# Patient Record
Sex: FEMALE | Race: WHITE | Employment: OTHER | URBAN - METROPOLITAN AREA
[De-identification: names, ages, dates, MRNs, and addresses within clinical notes are randomized per-mention and may not be internally consistent; named-entity substitution may affect disease eponyms.]

---

## 2021-04-09 ENCOUNTER — ANESTHESIA (OUTPATIENT)
Dept: EMERGENCY DEPT | Facility: HOSPITAL | Age: 86
DRG: 481 | End: 2021-04-09
Payer: MEDICARE

## 2021-04-09 ENCOUNTER — APPOINTMENT (OUTPATIENT)
Dept: GENERAL RADIOLOGY | Facility: HOSPITAL | Age: 86
DRG: 481 | End: 2021-04-09
Attending: EMERGENCY MEDICINE
Payer: MEDICARE

## 2021-04-09 ENCOUNTER — HOSPITAL ENCOUNTER (INPATIENT)
Facility: HOSPITAL | Age: 86
LOS: 4 days | Discharge: NURSING, REHAB OR LONG-TERM CARE | DRG: 481 | End: 2021-04-13
Attending: EMERGENCY MEDICINE | Admitting: HOSPITALIST
Payer: MEDICARE

## 2021-04-09 ENCOUNTER — ANESTHESIA EVENT (OUTPATIENT)
Dept: EMERGENCY DEPT | Facility: HOSPITAL | Age: 86
DRG: 481 | End: 2021-04-09
Payer: MEDICARE

## 2021-04-09 DIAGNOSIS — R73.9 HYPERGLYCEMIA: ICD-10-CM

## 2021-04-09 DIAGNOSIS — S72.342A CLOSED DISPLACED SPIRAL FRACTURE OF SHAFT OF LEFT FEMUR, INITIAL ENCOUNTER (HCC): Primary | ICD-10-CM

## 2021-04-09 DIAGNOSIS — F03.91 DEMENTIA WITH BEHAVIORAL DISTURBANCE, UNSPECIFIED DEMENTIA TYPE (HCC): ICD-10-CM

## 2021-04-09 PROCEDURE — 71045 X-RAY EXAM CHEST 1 VIEW: CPT | Performed by: EMERGENCY MEDICINE

## 2021-04-09 PROCEDURE — 99223 1ST HOSP IP/OBS HIGH 75: CPT | Performed by: HOSPITALIST

## 2021-04-09 PROCEDURE — 3E0T3BZ INTRODUCTION OF ANESTHETIC AGENT INTO PERIPHERAL NERVES AND PLEXI, PERCUTANEOUS APPROACH: ICD-10-PCS | Performed by: ANESTHESIOLOGY

## 2021-04-09 PROCEDURE — 73502 X-RAY EXAM HIP UNI 2-3 VIEWS: CPT | Performed by: EMERGENCY MEDICINE

## 2021-04-09 PROCEDURE — 73552 X-RAY EXAM OF FEMUR 2/>: CPT | Performed by: EMERGENCY MEDICINE

## 2021-04-09 RX ORDER — MORPHINE SULFATE 2 MG/ML
2 INJECTION, SOLUTION INTRAMUSCULAR; INTRAVENOUS EVERY 2 HOUR PRN
Status: DISCONTINUED | OUTPATIENT
Start: 2021-04-09 | End: 2021-04-13

## 2021-04-09 RX ORDER — METOCLOPRAMIDE HYDROCHLORIDE 5 MG/ML
5 INJECTION INTRAMUSCULAR; INTRAVENOUS EVERY 8 HOURS PRN
Status: DISCONTINUED | OUTPATIENT
Start: 2021-04-09 | End: 2021-04-13

## 2021-04-09 RX ORDER — ACETAMINOPHEN/DIPHENHYDRAMINE 500MG-25MG
1 TABLET ORAL DAILY PRN
Status: ON HOLD | COMMUNITY
End: 2021-04-13

## 2021-04-09 RX ORDER — ONDANSETRON 2 MG/ML
4 INJECTION INTRAMUSCULAR; INTRAVENOUS AS NEEDED
Status: CANCELLED | OUTPATIENT
Start: 2021-04-09 | End: 2021-04-10

## 2021-04-09 RX ORDER — HYDROCODONE BITARTRATE AND ACETAMINOPHEN 10; 325 MG/1; MG/1
2 TABLET ORAL AS NEEDED
Status: CANCELLED | OUTPATIENT
Start: 2021-04-09

## 2021-04-09 RX ORDER — SODIUM CHLORIDE 9 MG/ML
INJECTION, SOLUTION INTRAVENOUS CONTINUOUS
Status: DISCONTINUED | OUTPATIENT
Start: 2021-04-09 | End: 2021-04-13

## 2021-04-09 RX ORDER — ACETAMINOPHEN 325 MG/1
650 TABLET ORAL EVERY 6 HOURS PRN
Status: DISCONTINUED | OUTPATIENT
Start: 2021-04-09 | End: 2021-04-13

## 2021-04-09 RX ORDER — HYDROCODONE BITARTRATE AND ACETAMINOPHEN 10; 325 MG/1; MG/1
1 TABLET ORAL AS NEEDED
Status: CANCELLED | OUTPATIENT
Start: 2021-04-09

## 2021-04-09 RX ORDER — HYDROMORPHONE HYDROCHLORIDE 1 MG/ML
0.4 INJECTION, SOLUTION INTRAMUSCULAR; INTRAVENOUS; SUBCUTANEOUS EVERY 5 MIN PRN
Status: CANCELLED | OUTPATIENT
Start: 2021-04-09 | End: 2021-04-10

## 2021-04-09 RX ORDER — DEXTROSE MONOHYDRATE 25 G/50ML
50 INJECTION, SOLUTION INTRAVENOUS
Status: DISCONTINUED | OUTPATIENT
Start: 2021-04-09 | End: 2021-04-10

## 2021-04-09 RX ORDER — MORPHINE SULFATE 2 MG/ML
1 INJECTION, SOLUTION INTRAMUSCULAR; INTRAVENOUS EVERY 2 HOUR PRN
Status: DISCONTINUED | OUTPATIENT
Start: 2021-04-09 | End: 2021-04-13

## 2021-04-09 RX ORDER — ONDANSETRON 2 MG/ML
4 INJECTION INTRAMUSCULAR; INTRAVENOUS EVERY 4 HOURS PRN
Status: DISCONTINUED | OUTPATIENT
Start: 2021-04-09 | End: 2021-04-09

## 2021-04-09 RX ORDER — MORPHINE SULFATE 4 MG/ML
4 INJECTION, SOLUTION INTRAMUSCULAR; INTRAVENOUS EVERY 30 MIN PRN
Status: DISCONTINUED | OUTPATIENT
Start: 2021-04-09 | End: 2021-04-09

## 2021-04-09 RX ORDER — NALOXONE HYDROCHLORIDE 0.4 MG/ML
80 INJECTION, SOLUTION INTRAMUSCULAR; INTRAVENOUS; SUBCUTANEOUS AS NEEDED
Status: CANCELLED | OUTPATIENT
Start: 2021-04-09 | End: 2021-04-10

## 2021-04-09 RX ORDER — DIPHENHYDRAMINE HCL 25 MG
25 CAPSULE ORAL NIGHTLY PRN
Status: DISCONTINUED | OUTPATIENT
Start: 2021-04-09 | End: 2021-04-13

## 2021-04-09 RX ORDER — METOCLOPRAMIDE HYDROCHLORIDE 5 MG/ML
10 INJECTION INTRAMUSCULAR; INTRAVENOUS AS NEEDED
Status: CANCELLED | OUTPATIENT
Start: 2021-04-09 | End: 2021-04-10

## 2021-04-09 RX ORDER — ONDANSETRON 2 MG/ML
4 INJECTION INTRAMUSCULAR; INTRAVENOUS ONCE
Status: COMPLETED | OUTPATIENT
Start: 2021-04-09 | End: 2021-04-09

## 2021-04-09 RX ORDER — ONDANSETRON 2 MG/ML
4 INJECTION INTRAMUSCULAR; INTRAVENOUS EVERY 6 HOURS PRN
Status: DISCONTINUED | OUTPATIENT
Start: 2021-04-09 | End: 2021-04-13

## 2021-04-09 RX ORDER — ENALAPRILAT 2.5 MG/2ML
0.62 INJECTION INTRAVENOUS EVERY 8 HOURS
Status: DISCONTINUED | OUTPATIENT
Start: 2021-04-09 | End: 2021-04-12

## 2021-04-09 RX ORDER — ENOXAPARIN SODIUM 100 MG/ML
40 INJECTION SUBCUTANEOUS DAILY
Status: DISCONTINUED | OUTPATIENT
Start: 2021-04-09 | End: 2021-04-09

## 2021-04-09 RX ORDER — ROPIVACAINE HYDROCHLORIDE 5 MG/ML
30 INJECTION, SOLUTION EPIDURAL; INFILTRATION; PERINEURAL AS NEEDED
Status: DISCONTINUED | OUTPATIENT
Start: 2021-04-09 | End: 2021-04-12 | Stop reason: ALTCHOICE

## 2021-04-09 RX ORDER — SODIUM CHLORIDE 9 MG/ML
10 INJECTION INTRAVENOUS AS NEEDED
Status: DISCONTINUED | OUTPATIENT
Start: 2021-04-09 | End: 2021-04-12 | Stop reason: ALTCHOICE

## 2021-04-09 RX ORDER — SODIUM CHLORIDE, SODIUM LACTATE, POTASSIUM CHLORIDE, CALCIUM CHLORIDE 600; 310; 30; 20 MG/100ML; MG/100ML; MG/100ML; MG/100ML
INJECTION, SOLUTION INTRAVENOUS CONTINUOUS
Status: CANCELLED | OUTPATIENT
Start: 2021-04-09

## 2021-04-09 RX ORDER — LIDOCAINE HYDROCHLORIDE 10 MG/ML
2 INJECTION, SOLUTION EPIDURAL; INFILTRATION; INTRACAUDAL; PERINEURAL AS NEEDED
Status: DISCONTINUED | OUTPATIENT
Start: 2021-04-09 | End: 2021-04-12 | Stop reason: ALTCHOICE

## 2021-04-09 RX ORDER — MORPHINE SULFATE 4 MG/ML
4 INJECTION, SOLUTION INTRAMUSCULAR; INTRAVENOUS EVERY 30 MIN PRN
Status: DISCONTINUED | OUTPATIENT
Start: 2021-04-09 | End: 2021-04-13

## 2021-04-09 NOTE — ED PROVIDER NOTES
Patient Seen in: [facility] Emergency Department      History   Patient presents with:  Trauma    Stated Complaint:     HPI/Subjective:   HPI    55-year-old female who has a history of dementia and is primarily Portuguese-speaking presents after a fall are normal.      Palpations: Abdomen is soft. Tenderness: There is no abdominal tenderness. There is no rebound. Musculoskeletal:         General: Tenderness, deformity and signs of injury present.       Cervical back: Normal range of motion and neck ---------                               -----------         ------                     CBC W/ DIFFERENTIAL[[id number]]          Abnormal            Final result                 Please view results for these tests on the individual orders.    URINALYSIS WIT pleural parenchymal scarring. Bronchial wall thickening is noted. This may relate to bronchitis and/or reactive airway disease. There is a nodular opacity which projects over the lateral lower right lung. CONCLUSION:  1.  Mild bibasilar atelec hospitalist.  Is anticipated that she will have surgery tomorrow. We did contact anesthesia to do a femoral block. Patient tolerated her femoral block well. She will be admitted to the floor.   She was seen by the [facility] hospitalist while in the emerge

## 2021-04-09 NOTE — H&P
[facility] HOSPITALIST  History and Physical     Mable Harden Patient Status:  Emergency    7/15/1930 MRN [id number]   Location [address] [facility] Attending Lisbeth Marshall MD   Hosp Day # 0 PCP PHYSICIAN NONSTAFF     Chief Complaint GFRNAA 85   CA 9.1   ALB 3.5      K 3.6      CO2 28.0   ALKPHO 90   AST 16   ALT 17   BILT 0.6   TP 7.2       CrCl cannot be calculated (Unknown ideal weight. ).     Recent Labs   Lab 04/09/21  1555   PTP 13.6   INR 1.01       COVID-19 Lab Resu

## 2021-04-09 NOTE — ANESTHESIA PROCEDURE NOTES
Regional Block  Performed by: Desiree Novak MD  Authorized by: Desiree Novak MD       General Information and Staff    Start Time:  4/9/2021 6:03 PM  End Time:  4/9/2021 6:07 PM  Anesthesiologist:  Desiree Novak MD  Performed by:   Anesthesio

## 2021-04-09 NOTE — PROGRESS NOTES
Ortho Brief Note    Called regarding patient with left femur fracture. Images reviewed and confirm.        Admit to hospitalist, appreciate risk stratification and medical optimization  Patient would benefit from operative intervention  PAT ALDANA   Npo at MN,

## 2021-04-09 NOTE — ED INITIAL ASSESSMENT (HPI)
Patient arrives via medics after witnessed fall to wood floor at home.   External rotation of the left leg upon arrival.

## 2021-04-10 ENCOUNTER — APPOINTMENT (OUTPATIENT)
Dept: GENERAL RADIOLOGY | Facility: HOSPITAL | Age: 86
DRG: 481 | End: 2021-04-10
Attending: ORTHOPAEDIC SURGERY
Payer: MEDICARE

## 2021-04-10 ENCOUNTER — ANESTHESIA (OUTPATIENT)
Dept: SURGERY | Facility: HOSPITAL | Age: 86
DRG: 481 | End: 2021-04-10
Payer: MEDICARE

## 2021-04-10 ENCOUNTER — ANESTHESIA EVENT (OUTPATIENT)
Dept: SURGERY | Facility: HOSPITAL | Age: 86
DRG: 481 | End: 2021-04-10
Payer: MEDICARE

## 2021-04-10 PROCEDURE — 0QS706Z REPOSITION LEFT UPPER FEMUR WITH INTRAMEDULLARY INTERNAL FIXATION DEVICE, OPEN APPROACH: ICD-10-PCS | Performed by: ORTHOPAEDIC SURGERY

## 2021-04-10 PROCEDURE — 99232 SBSQ HOSP IP/OBS MODERATE 35: CPT | Performed by: HOSPITALIST

## 2021-04-10 PROCEDURE — 76000 FLUOROSCOPY <1 HR PHYS/QHP: CPT | Performed by: ORTHOPAEDIC SURGERY

## 2021-04-10 PROCEDURE — 73552 X-RAY EXAM OF FEMUR 2/>: CPT | Performed by: ORTHOPAEDIC SURGERY

## 2021-04-10 DEVICE — ZNN CMN NAIL 13MMX40CM 125 L
Type: IMPLANTABLE DEVICE | Site: HIP | Status: FUNCTIONAL
Brand: ZIMMER® NATURAL NAIL® SYSTEM

## 2021-04-10 DEVICE — ZNN CMN LAG SCREW 10.5X85
Type: IMPLANTABLE DEVICE | Site: HIP | Status: FUNCTIONAL
Brand: ZIMMER® NATURAL NAIL® SYSTEM

## 2021-04-10 DEVICE — SCREW CORT FA 5.0X42.5 Z NAIL: Type: IMPLANTABLE DEVICE | Site: HIP | Status: FUNCTIONAL

## 2021-04-10 DEVICE — SCREW CORT FA 5.0X37.5 Z NAIL: Type: IMPLANTABLE DEVICE | Site: HIP | Status: FUNCTIONAL

## 2021-04-10 RX ORDER — METOCLOPRAMIDE HYDROCHLORIDE 5 MG/ML
10 INJECTION INTRAMUSCULAR; INTRAVENOUS AS NEEDED
Status: DISCONTINUED | OUTPATIENT
Start: 2021-04-10 | End: 2021-04-10 | Stop reason: HOSPADM

## 2021-04-10 RX ORDER — HYDROCODONE BITARTRATE AND ACETAMINOPHEN 10; 325 MG/1; MG/1
1 TABLET ORAL AS NEEDED
Status: DISCONTINUED | OUTPATIENT
Start: 2021-04-10 | End: 2021-04-10 | Stop reason: HOSPADM

## 2021-04-10 RX ORDER — CEFAZOLIN SODIUM 1 G/3ML
INJECTION, POWDER, FOR SOLUTION INTRAMUSCULAR; INTRAVENOUS AS NEEDED
Status: DISCONTINUED | OUTPATIENT
Start: 2021-04-10 | End: 2021-04-10 | Stop reason: SURG

## 2021-04-10 RX ORDER — HYDROCODONE BITARTRATE AND ACETAMINOPHEN 10; 325 MG/1; MG/1
2 TABLET ORAL AS NEEDED
Status: DISCONTINUED | OUTPATIENT
Start: 2021-04-10 | End: 2021-04-10 | Stop reason: HOSPADM

## 2021-04-10 RX ORDER — CEFAZOLIN SODIUM/WATER 2 G/20 ML
2 SYRINGE (ML) INTRAVENOUS EVERY 8 HOURS
Status: COMPLETED | OUTPATIENT
Start: 2021-04-10 | End: 2021-04-11

## 2021-04-10 RX ORDER — ENOXAPARIN SODIUM 100 MG/ML
40 INJECTION SUBCUTANEOUS DAILY
Status: DISCONTINUED | OUTPATIENT
Start: 2021-04-11 | End: 2021-04-13

## 2021-04-10 RX ORDER — PHENYLEPHRINE HCL 10 MG/ML
VIAL (ML) INJECTION AS NEEDED
Status: DISCONTINUED | OUTPATIENT
Start: 2021-04-10 | End: 2021-04-10 | Stop reason: SURG

## 2021-04-10 RX ORDER — NALOXONE HYDROCHLORIDE 0.4 MG/ML
80 INJECTION, SOLUTION INTRAMUSCULAR; INTRAVENOUS; SUBCUTANEOUS AS NEEDED
Status: DISCONTINUED | OUTPATIENT
Start: 2021-04-10 | End: 2021-04-10 | Stop reason: HOSPADM

## 2021-04-10 RX ORDER — SODIUM CHLORIDE, SODIUM LACTATE, POTASSIUM CHLORIDE, CALCIUM CHLORIDE 600; 310; 30; 20 MG/100ML; MG/100ML; MG/100ML; MG/100ML
INJECTION, SOLUTION INTRAVENOUS CONTINUOUS
Status: DISCONTINUED | OUTPATIENT
Start: 2021-04-10 | End: 2021-04-10 | Stop reason: HOSPADM

## 2021-04-10 RX ORDER — ONDANSETRON 2 MG/ML
4 INJECTION INTRAMUSCULAR; INTRAVENOUS AS NEEDED
Status: DISCONTINUED | OUTPATIENT
Start: 2021-04-10 | End: 2021-04-10 | Stop reason: HOSPADM

## 2021-04-10 RX ORDER — ROCURONIUM BROMIDE 10 MG/ML
INJECTION, SOLUTION INTRAVENOUS AS NEEDED
Status: DISCONTINUED | OUTPATIENT
Start: 2021-04-10 | End: 2021-04-10 | Stop reason: SURG

## 2021-04-10 RX ORDER — HYDROMORPHONE HYDROCHLORIDE 1 MG/ML
0.4 INJECTION, SOLUTION INTRAMUSCULAR; INTRAVENOUS; SUBCUTANEOUS EVERY 5 MIN PRN
Status: DISCONTINUED | OUTPATIENT
Start: 2021-04-10 | End: 2021-04-10 | Stop reason: HOSPADM

## 2021-04-10 RX ADMIN — CEFAZOLIN SODIUM 2 G: 1 INJECTION, POWDER, FOR SOLUTION INTRAMUSCULAR; INTRAVENOUS at 13:40:00

## 2021-04-10 RX ADMIN — PHENYLEPHRINE HCL 100 MCG: 10 MG/ML VIAL (ML) INJECTION at 14:49:00

## 2021-04-10 RX ADMIN — PHENYLEPHRINE HCL 100 MCG: 10 MG/ML VIAL (ML) INJECTION at 14:12:00

## 2021-04-10 RX ADMIN — ONDANSETRON 4 MG: 2 INJECTION INTRAMUSCULAR; INTRAVENOUS at 15:13:00

## 2021-04-10 RX ADMIN — SODIUM CHLORIDE: 9 INJECTION, SOLUTION INTRAVENOUS at 15:53:00

## 2021-04-10 RX ADMIN — SODIUM CHLORIDE: 9 INJECTION, SOLUTION INTRAVENOUS at 14:39:00

## 2021-04-10 RX ADMIN — LIDOCAINE HYDROCHLORIDE 30 MG: 10 INJECTION, SOLUTION EPIDURAL; INFILTRATION; INTRACAUDAL; PERINEURAL at 13:24:00

## 2021-04-10 RX ADMIN — ROCURONIUM BROMIDE 30 MG: 10 INJECTION, SOLUTION INTRAVENOUS at 13:24:00

## 2021-04-10 NOTE — CONSULTS
ORTHO CONSULT NOTE    Patient Identification:        Name: Jc Kirkland  Age: 80year old  Sex: female  :  7/15/1930  MRN: [id number]                                              Requesting Physician: Dr Cooper Doing      HPI:        Jc Kirkland 1 mg, Injection, PRN  Sodium Chloride (PF) 0.9 % solution 10 mL, 10 mL, Injection, PRN  enalaprilat (VASOTEC) injection 0.625 mg, 0.625 mg, Intravenous, Q8H  diphenhydrAMINE (BENADRYL) cap/tab 25 mg, 25 mg, Oral, Nightly PRN  0.9% NaCl infusion, , Intraven the contralateral hip is normal:  No atrophy, erythema, ecchymosis, or gross deformity noted  No tenderness to palpation  Full active range of motion  5/5 strength in hip flexion, abduction, and adduction  Stinchfield and straight leg raise negative  CHAD post-operative course and rehab plan were discussed as well. Activity restrictions following the surgery were emphasized and the patient expressed understanding. All the patient's questions were answered.   A consent form was signed and placed on the jimmy conservative vs operative treatment, patient would benefit from operative intervention  NWB LLE   Npo, hold anticoagulation for possible OR today   Plan for Left hip IMN with ORIF   Discussed risks, benefits and alternatives to treatment  Pt marked, consen

## 2021-04-10 NOTE — PROGRESS NOTES
Patient is back from OR, drowsy, mentation at her baseline. Son present at the bedside. VS stable. Left hip incision is with aquacel x3, CDI, ice pack on. Will be WBAT, awaiting PT eval in am.  Per PACU RN patient had a BM during the case in OR.    All saf

## 2021-04-10 NOTE — OPERATIVE REPORT
OPERATIVE REPORT    Facility:  [address] [place] [address]    Patient Name:  Vicki Land    Age/Gender:  80year old female  :  7/15/1930    MRN:  [id number]    Date of Operation:  4/10/2021    Preoperative Diagnosis:  LEFT SUBTROCHANTERIC FEMUR FRACTURE was created and a ball tipped guidewire was passed across the fracture site and down the intramedullary canal. The opening reamer was used to open the proximal femur and flexible reamers were used to ream the femoral medullary canal pushing past the fractu

## 2021-04-10 NOTE — ANESTHESIA PREPROCEDURE EVALUATION
PRE-OP EVALUATION    Patient Name: Yobani Harry    Admit Diagnosis: Hyperglycemia [R73.9]  Closed displaced spiral fracture of shaft of left femur, initial encounter ([facility] [address] Pradip Coad  Dementia with behavioral disturbance, unspecified dementia type (HC Or  Glucose-Vitamin C (DEX-4) chewable tab 4 tablet, 4 tablet, Oral, Q15 Min PRN   Or  dextrose 50 % injection 50 mL, 50 mL, Intravenous, Q15 Min PRN   Or  glucose (DEX4) oral liquid 30 g, 30 g, Oral, Q15 Min PRN   Or  Glucose-Vitamin C (DEX-4) chewable ta regular  Rate: normal     Dental             Pulmonary    Pulmonary exam normal.  Breath sounds clear to auscultation bilaterally. Other findings            ASA: 3   Plan: general  NPO status verified and patient meets guidelines.         Comm

## 2021-04-10 NOTE — PLAN OF CARE
Alert and confused,speaks Kinyarwanda,per son she is also SUNDOWNERS,frequently monitored for safety,IVF infusing,kept NPO,pulled purewick 2x,and attempting to pull IV out too,secured. Plan for surgery today at 12,consent signed by son. Safety measures reinforce

## 2021-04-10 NOTE — PROGRESS NOTES
NURSING ADMISSION NOTE      Patient admitted via cart. Oriented to room. Safety precautions initiated. Bed in low position. Call light in reach. Accompanied by son,routine admission care rendered. spoke with son and provided info for admission. Plan

## 2021-04-10 NOTE — PROGRESS NOTES
[facility] HOSPITALIST  Progress Note     Duyen Childers Patient Status:  Inpatient    7/15/1930 MRN [id number]   Location [facility] Attending Izabel Damian, [facility] Day # 1 PCP PHYSICIAN NONSTAFF     Chief Complaint: Jazz Means for input(s): CK in the last 168 hours. Inflammatory Markers  No results for input(s): CRP, ROSEY, LDH, DDIMER in the last 168 hours. Imaging: Imaging data reviewed in Epic.     Medications:   • potassium chloride  40 mEq Intravenous Once   • enalaprila

## 2021-04-10 NOTE — PHYSICAL THERAPY NOTE
PT orders received, chart reviewed. Pt is scheduled to have surgery today. Therefore, pt will require new PT/OT orders as well as any WB restrictions.  YURY

## 2021-04-10 NOTE — ANESTHESIA PROCEDURE NOTES
Airway  Date/Time: 4/10/2021 1:26 PM  Urgency: elective    Airway not difficult    General Information and Staff    Patient location during procedure: OR  Anesthesiologist: Citlaly Serna MD  Performed: anesthesiologist     Indications and Patient Cond

## 2021-04-10 NOTE — ANESTHESIA POSTPROCEDURE EVALUATION
Yared[address] Patient Status:  Inpatient   Age/Gender 80year old female MRN [id number]   [facility] SURGERY Attending Feroz Gamino, [address] [facility] Day # 1 PCP PHYSICIAN NONSTAFF       Anesthesia Post-op Note    OPEN REDUCTI

## 2021-04-11 PROCEDURE — 99232 SBSQ HOSP IP/OBS MODERATE 35: CPT | Performed by: HOSPITALIST

## 2021-04-11 NOTE — PHYSICAL THERAPY NOTE
PHYSICAL THERAPY EVALUATION - INPATIENT     Room Number: 261/401-C  Evaluation Date: 4/11/2021  Type of Evaluation: Initial  Physician Order: PT Eval and Treat    Presenting Problem: closed displaced spiral fracture of shaft of L femur  Reason for Th plan is for pt to go to rehab. OBJECTIVE  Precautions: Bed/chair alarm;Hard of hearing; Other (Comment) (dementia)  Fall Risk: High fall risk    WEIGHT BEARING RESTRICTION  Weight Bearing Restriction: L lower extremity           L Lower Extremity: Weight tested           Stoop/Curb Assistance: Not tested       Skilled Therapy Provided: PT orders received, chart reviewed and RN approved PT session. PT with proper PPE donned to include mask, gloves, and goggles. Pt with mask donned 80% of the time.      Pt ly patients with this level of impairment may benefit from DC to [facility]. Based on this evaluation, patient's clinical presentation is evolving and overall the evaluation complexity is considered low.   These impairments and comorbidities manifest themselves as f

## 2021-04-11 NOTE — CM/SW NOTE
Sw spoke to ortho RN - PT recs Nolvia although eval was done was when pt was difficult to arouse. They aanticipate need for NOLVIA. Attempted to reach son but unable to do so. NOLVIA referral started in [place]. NH screen requested.     Sincere Evans, EDWARW  Social

## 2021-04-11 NOTE — PLAN OF CARE
Drowsy but easily arousable, denies pain, due to void, Rehab recommended per PT, Tylenol given for pain, Meals ordered per family, family requests South African speaking staff since confusion noted at times, fall precautions maintained, freq.  Respositioning, HOB

## 2021-04-11 NOTE — PROGRESS NOTES
[facility] HOSPITALIST  Progress Note     Meneses Gains Patient Status:  Inpatient    7/15/1930 MRN [id number]   Location [facility] Attending Celine Maldonado, [address] [facility] Day # 2 PCP PHYSICIAN NONSTAFF     Chief Complaint: Trey Kayser SAlejo Krauss Detected 04/09/2021       Pro-Calcitonin  No results for input(s): PCT in the last 168 hours. Cardiac  No results for input(s): TROP, PBNP in the last 168 hours. Creatinine Kinase  No results for input(s): CK in the last 168 hours.     Inflammatory Ma

## 2021-04-11 NOTE — OCCUPATIONAL THERAPY NOTE
OCCUPATIONAL THERAPY EVALUATION - INPATIENT     Room Number: 323/588-K  Evaluation Date: 4/11/2021  Type of Evaluation: Initial  Presenting Problem: L femur fracture s/p IM fixation    Physician Order: IP Consult to Occupational Therapy  Reason for Therapy RESTRICTION  Weight Bearing Restriction: L lower extremity           L Lower Extremity: Weight Bearing as Tolerated    PAIN ASSESSMENT  Rating: Unable to rate  Location:  (L hip)       COGNITION  alert to self only, TBA further    VISION  unable to assess MMT or AROM, AAROM was assessed  Patient was dependently boosted in bed and HOB was raised. SCDs and bunny boots re-applied, bed alarm on , .  OT educated patient's on on recommendation for [facility], he verbalized understanding and agreement.   Patient End of S Complexity LOW     OT Discharge Recommendations: Sub-acute rehabilitation (elos 18-21 days)  OT Device Recommendations: TBD    PLAN  OT Treatment Plan: Balance activities; Energy conservation/work simplification techniques;ADL training;Functional transfer t

## 2021-04-11 NOTE — PLAN OF CARE
PT with history of dementia. Sleeping on and off. , unable to do IS. Unable to void tonight. Straight cathed tonight. Starting lovenox. SCDS and bunny boots on. WBAT, PT OT li in AM. Aquacels x3, CDI. Ice to site.  Safety precautions in place, alarm on

## 2021-04-11 NOTE — PROGRESS NOTES
Progress Note    Patient: Anais Srivastava  Medical record #: [id number]    Anais Srivastava is a 80year old y/o female who is POD #1 IM nailing for left subtrochanteric femur fracture.   The patient's main complaint today is surgical pain at the operat Oral, Nightly PRN  0.9% NaCl infusion, , Intravenous, Continuous  acetaminophen (TYLENOL) tab 650 mg, 650 mg, Oral, Q6H PRN  [MAR Hold] morphINE sulfate (PF) 2 MG/ML injection 1 mg, 1 mg, Intravenous, Q2H PRN   Or  [MAR Hold] morphINE sulfate (PF) 2 MG/ML Hyperglycemia    Dementia with behavioral disturbance, unspecified dementia type [facility])          Signed:  Vania Hough  4/11/2021  8:04 AM

## 2021-04-12 PROCEDURE — 99232 SBSQ HOSP IP/OBS MODERATE 35: CPT | Performed by: HOSPITALIST

## 2021-04-12 RX ORDER — LISINOPRIL 20 MG/1
20 TABLET ORAL DAILY
Status: DISCONTINUED | OUTPATIENT
Start: 2021-04-12 | End: 2021-04-13

## 2021-04-12 NOTE — PLAN OF CARE
Alert x 2, forgetful but able to follow simple instructions when spoken on her native language. Left hip with aquacel dressing, small shadow drainage noted. Dressing looks clean and intact, ice pack applied.   Up to chair with max assistance with PT and O

## 2021-04-12 NOTE — PLAN OF CARE
Sri Lankan speaking. Guarding surgical site. Tylenol crushed and given in apple sauce for pain. Repositioning patient frequently for comfort and to protect skin. Void x1 into brief tonight. Bladder scanning. Following urinary retention protocol.  Resting in be

## 2021-04-12 NOTE — OCCUPATIONAL THERAPY NOTE
OCCUPATIONAL THERAPY TREATMENT NOTE - INPATIENT     Room Number: 889/962-Z  Session: 1   Number of Visits to Meet Established Goals: 6    Presenting Problem: L femur fracture s/p IM fixation    History related to current admission: Admitted 4/9/21 from [facility] Sit : Dependent assistance (max x 2)  Sit to Stand: Dependent assistance (max x1, mod x 1)    Skilled Therapy Provided: Patient received in bed with daughter in law present and translating.  Patient oriented to self only, re-orientation to situation and ayanna 4/12/21    ADL Goals   Patient will perform grooming: with setup, with stand by assist, while in chair and with cues  Patient will perform upper body dressing:  with min assist  Patient will perform toileting: with max assist     Functional Transfer Goals

## 2021-04-12 NOTE — CM/SW NOTE
04/12/21 1100   CM/SW Referral Data   Referral Source Social Work (self-referral)   Reason for Referral Discharge planning   Informant Children   Patient Info   Patient's Mental Status Confused   Patient Communication Issues Language barrier   Discharge

## 2021-04-12 NOTE — PHYSICAL THERAPY NOTE
PHYSICAL THERAPY HIP TREATMENT NOTE - INPATIENT      Room Number: 066/875-J     Session: 1  Number of Visits to Meet Established Goals: 5    Presenting Problem: closed displaced spiral fracture of shaft of L femur    Problem List  Principal Problem:    Shobha w/tylenol)    BALANCE  Static Sitting: Fair  Dynamic Sitting: Poor +  Static Standing: Poor -  Dynamic Standing: Dependent  ACTIVITY TOLERANCE  Room air    AM-PAC '6-Clicks' INPATIENT SHORT FORM - BASIC MOBILITY  How much difficulty does the patient osmany encouragement, reassurance, t/f tech's, pain control tech's.     Exercise/Education Provided:  Gait training  Posture  Transfer training    Staff communications: in person with rn, Gen, and thru bubble chat with rn and PCT regarding DC recommendation and pt Pt's daughter-in-law in surgical mask.

## 2021-04-12 NOTE — PROGRESS NOTES
[facility] HOSPITALIST  Progress Note     Melba Marc Patient Status:  Inpatient    7/15/1930 MRN [id number]   Location [facility] Attending Mary Kay Isaacs, [address] Day # 3 PCP Ashley Reynoso MD     Chief Complaint: Tristian Ramirez Pro-Calcitonin  No results for input(s): PCT in the last 168 hours. Cardiac  No results for input(s): TROP, PBNP in the last 168 hours. Creatinine Kinase  No results for input(s): CK in the last 168 hours.     Inflammatory Markers  No results fo

## 2021-04-13 VITALS
DIASTOLIC BLOOD PRESSURE: 93 MMHG | HEIGHT: 60 IN | RESPIRATION RATE: 16 BRPM | HEART RATE: 106 BPM | SYSTOLIC BLOOD PRESSURE: 121 MMHG | OXYGEN SATURATION: 99 % | WEIGHT: 127 LBS | BODY MASS INDEX: 24.94 KG/M2 | TEMPERATURE: 97 F

## 2021-04-13 PROCEDURE — 99239 HOSP IP/OBS DSCHRG MGMT >30: CPT | Performed by: HOSPITALIST

## 2021-04-13 RX ORDER — LISINOPRIL 20 MG/1
20 TABLET ORAL DAILY
Qty: 30 TABLET | Refills: 0 | Status: SHIPPED | OUTPATIENT
Start: 2021-04-13 | End: 2021-05-13

## 2021-04-13 RX ORDER — ACETAMINOPHEN 325 MG/1
650 TABLET ORAL EVERY 6 HOURS PRN
Qty: 30 TABLET | Refills: 0 | Status: SHIPPED | OUTPATIENT
Start: 2021-04-13

## 2021-04-13 RX ORDER — ENOXAPARIN SODIUM 100 MG/ML
40 INJECTION SUBCUTANEOUS DAILY
Qty: 5.6 ML | Refills: 0 | Status: SHIPPED | OUTPATIENT
Start: 2021-04-13 | End: 2021-04-27

## 2021-04-13 NOTE — CM/SW NOTE
04/13/21 1417   Discharge disposition   Expected discharge disposition Skilled Nurs   Name of Facillity/Home Care/Hospice   (the [facility])   Discharge transportation [facility]

## 2021-04-13 NOTE — CM/SW NOTE
Patient will dc today to [address]: [phone] by [facility] at 1pm.  PCS form completed.       Connor Sheehan LCSW

## 2021-04-13 NOTE — PLAN OF CARE
Alert Ox2. Forgetful at times. Tylenol given for pain. Left hip aquacel intact. Villalta draining clear yellow urine. Plan of care reviewed. Bed alarm on. Call light within reach.

## 2021-04-13 NOTE — DISCHARGE SUMMARY
[facility] HOSPITALIST  DISCHARGE SUMMARY     Iris Benedict Patient Status:  Inpatient    7/15/1930 MRN [id number]   [facility] 3SW-A Attending Mary Anne Crockett, 160[address] [facility] Day # 4 PCP Cecily Levy MD     Date of Admission: 2021  Date of 325 MG Tabs  Commonly known as: TYLENOL      Take 2 tablets (650 mg total) by mouth every 6 (six) hours as needed for Pain or Fever.    Quantity: 30 tablet  Refills: 0     Enoxaparin Sodium 40 MG/0.4ML Soln  Commonly known as: LOVENOX      Inject 0.4 mL (40

## 2021-04-13 NOTE — PAYOR COMM NOTE
--------------  ADMISSION REVIEW     Payor: [address] [employer]  Subscriber #:  [id number]  Authorization Number: [id number]       ED Provider Notes        Patient Seen in: [facility] Emergency Department      History   Patient presents with:  [address] Enrique Dr Breath sounds: Normal breath sounds. No stridor. No wheezing. Abdominal:      General: Bowel sounds are normal.      Palpations: Abdomen is soft. Tenderness: There is no abdominal tenderness. There is no rebound.    Musculoskeletal:         Gene 77  Rhythm: Sinus Rhythm  Reading: First-degree AV block no acute ST segment changes         XR FEMUR MIN 2 VIEWS LEFT (CPT=73552)    Result Date: 4/9/2021  PROCEDURE:  XR FEMUR MIN 2 VIEWS LEFT (CPT=73552)  TECHNIQUE:  AP and lateral views were obtained. the lateral lower right lung. This likely represents summation of shadows, however, nonemergent PA and lateral radiographs are recommended to exclude a pulmonary nodule.     Dictated by (CST): Preeti Patterson MD on 4/09/2021 at 5:39 PM     Finalized by ( diagnosis)  Hyperglycemia  Dementia with behavioral disturbance, unspecified dementia type ([facility] [address]     Disposition:  Admit  4/9/2021  5:29 pm             H&P - H&P Note          Chief Complaint: Fall    History of Present Illness: Tiffanie Narvaez is a 80 y control  3. Bedrest  4. Ortho consult  2. Diabetes mellitus  1. Stopped Metformin some time ago  2. Correctional scale  3. Check A1c   3. Essential hypertension  1. Stopped Lisinopril some time ago  2. Vasotec IV  3.  May need to add Hydralazine PRN, but wi left subtrochanteric femur fracture     Plan:   Mobilize with PT, WBAT on operative extremity with walker  Pain controlled with meds  Typically recommend Lovenox 40mg daily for DVT prophylaxis for 2 weeks, then transition to ECASA 325mg BID daily 4 weeks  T (Room air)       04/09/21 1603 98.2 °F (36.8 °C) 77 20 201/95Abnormal  100 % — None (Room air)

## 2021-04-14 NOTE — PAYOR COMM NOTE
--------------  DISCHARGE REVIEW    Sosa Savage MA [facility]  Subscriber #:  [id number]  Authorization Number: [id number]    Admit date: 4/9/21  Admit time:   9:42 PM  Discharge Date: 4/13/2021  1:00 PM     Admitting Physician: Tonny Huerta MD  Attending Phys arranged by SANTANA. Patient discharged to [facility] in good condition. Lace+ Score: 66  59-90 High Risk  29-58 Medium Risk  0-28   Low Risk         TCM Follow-Up Recommendation:  LACE > 58:  High Risk of readmission after discharge from the hospital.    Procedure bilaterally. No wheezes. No rhonchi. Cardiovascular: S1, S2. Regular rate and rhythm. No murmurs, rubs or gallops. Abdomen: Soft, nontender, nondistended. Positive bowel sounds. No rebound or guarding. Neurologic: No focal neurological deficits.    Beba Fusi

## 2021-04-30 ENCOUNTER — HOSPITAL ENCOUNTER (EMERGENCY)
Facility: HOSPITAL | Age: 86
Discharge: HOME | End: 2021-04-30
Attending: EMERGENCY MEDICINE
Payer: MEDICARE

## 2021-04-30 VITALS
RESPIRATION RATE: 19 BRPM | HEART RATE: 105 BPM | WEIGHT: 127 LBS | OXYGEN SATURATION: 100 % | TEMPERATURE: 98 F | BODY MASS INDEX: 21.16 KG/M2 | DIASTOLIC BLOOD PRESSURE: 84 MMHG | HEIGHT: 65 IN | SYSTOLIC BLOOD PRESSURE: 117 MMHG

## 2021-04-30 DIAGNOSIS — L98.421 SKIN ULCER OF SACRUM, LIMITED TO BREAKDOWN OF SKIN (HCC): ICD-10-CM

## 2021-04-30 DIAGNOSIS — N30.01 ACUTE CYSTITIS WITH HEMATURIA: Primary | ICD-10-CM

## 2021-04-30 PROCEDURE — 36415 COLL VENOUS BLD VENIPUNCTURE: CPT

## 2021-04-30 PROCEDURE — 87186 SC STD MICRODIL/AGAR DIL: CPT | Performed by: EMERGENCY MEDICINE

## 2021-04-30 PROCEDURE — 87086 URINE CULTURE/COLONY COUNT: CPT | Performed by: EMERGENCY MEDICINE

## 2021-04-30 PROCEDURE — 87077 CULTURE AEROBIC IDENTIFY: CPT | Performed by: EMERGENCY MEDICINE

## 2021-04-30 PROCEDURE — 80053 COMPREHEN METABOLIC PANEL: CPT | Performed by: EMERGENCY MEDICINE

## 2021-04-30 PROCEDURE — 99283 EMERGENCY DEPT VISIT LOW MDM: CPT

## 2021-04-30 PROCEDURE — 81001 URINALYSIS AUTO W/SCOPE: CPT | Performed by: EMERGENCY MEDICINE

## 2021-04-30 PROCEDURE — 85025 COMPLETE CBC W/AUTO DIFF WBC: CPT | Performed by: EMERGENCY MEDICINE

## 2021-04-30 RX ORDER — CEPHALEXIN 500 MG/1
500 CAPSULE ORAL 4 TIMES DAILY
Qty: 40 CAPSULE | Refills: 0 | Status: SHIPPED | OUTPATIENT
Start: 2021-04-30 | End: 2021-05-10

## 2021-04-30 NOTE — ED QUICK NOTES
Spoke with nurse for pt at the [facility]    Nurse states that the wound care nurse seeing her at the [facility] was concerned about sacral wound appears gangrenous    Pt has had poor oral intake and had order for D5.45 on 4/28 but none since    Concer

## 2021-04-30 NOTE — ED PROVIDER NOTES
Patient Seen in: [facility] Emergency Department      History   Patient presents with:  Cellulitis  Urinary Retention    Stated Complaint: came from the [address]     HPI/Subjective:   HPI    20-year-old female presents here to the [address] [facility] surgical history.               Social History    Tobacco Use      Smoking status: Never Smoker      Smokeless tobacco: Never Used    Vaping Use      Vaping Use: Never used    Alcohol use: Not Currently    Drug use: Not Currently             Review of Syste Bacteria Urine 1+ (*)     Squamous Epi.  Cells Few (*)     All other components within normal limits   COMP METABOLIC PANEL (14) - Abnormal; Notable for the following components:    Potassium 3.4 (*)     Creatinine 0.40 (*)     BUN/CREA Ratio 25.0 (*)     A believes that she will provide better care than the care that she believes is being done at the skilled nursing facility. She understands if the care becomes difficult at home that they should then look for other skilled nursing care.   Our  sp

## 2021-04-30 NOTE — ED QUICK NOTES
Pt's daughter determined it is possible for Pt to sit in a wheelchair, therefore, a medi-car was ordered. ETA 20 minutes. RN notified.

## 2021-04-30 NOTE — ED QUICK NOTES
Pt asleep and in no obvious distress at this time. Pt does appear to be in sinus but with frequent pac's at this time.

## 2021-04-30 NOTE — CM/SW NOTE
Avtar spoke with Cecilia Knox at the [facility] in [place]. Michael Parker states the plan was to dc patient tomorrow with family if patient is stable.     Michael Parker states they have a / on the case that provides discharge teaching with patient and

## 2021-04-30 NOTE — ED QUICK NOTES
Report given to DAMIAN Lanza. Pt awake and alert to her norm per family at bedside, skin w/d,resps appear unlabored. Pt cleaned of small amount of stool, perineal area cleansed throroughly and depend placed.  Pt repositioned on cart for comfort and tow

## 2021-04-30 NOTE — CM/SW NOTE
CM met with daughter in law at bedside. Per daughter in law, \"I am very upset with the care at the [facility]. My mom is usually a happy person, laughing and joking, and every time I have been there she has been sleeping and in bed.  We have care conferences

## 2021-04-30 NOTE — ED INITIAL ASSESSMENT (HPI)
Pt presents to the ED from the [facility] with complaints per EMS of failure to thrive, not eating. Concern for gangrene to wound to sacrum and heels, urinary retention.  Pt awake , Uzbek speaking, skin w/d,resps reg/unlabored, mucous membranes slightly moist.

## 2022-04-11 ENCOUNTER — NURSING HOME VISIT (OUTPATIENT)
Dept: FAMILY MEDICINE | Age: 87
End: 2022-04-11

## 2022-04-11 DIAGNOSIS — M19.90 ARTHRITIS: ICD-10-CM

## 2022-04-11 DIAGNOSIS — G30.1 LATE ONSET ALZHEIMER'S DEMENTIA WITHOUT BEHAVIORAL DISTURBANCE (CMD): Primary | ICD-10-CM

## 2022-04-11 DIAGNOSIS — F02.80 LATE ONSET ALZHEIMER'S DEMENTIA WITHOUT BEHAVIORAL DISTURBANCE (CMD): Primary | ICD-10-CM

## 2022-04-11 PROCEDURE — 99305 1ST NF CARE MODERATE MDM 35: CPT | Performed by: FAMILY MEDICINE

## 2022-04-13 PROBLEM — F02.80 LATE ONSET ALZHEIMER'S DEMENTIA WITHOUT BEHAVIORAL DISTURBANCE (CMD): Status: ACTIVE | Noted: 2022-04-13

## 2022-04-13 PROBLEM — G30.1 LATE ONSET ALZHEIMER'S DEMENTIA WITHOUT BEHAVIORAL DISTURBANCE (CMD): Status: ACTIVE | Noted: 2022-04-13

## 2022-04-13 PROBLEM — M19.90 ARTHRITIS: Status: ACTIVE | Noted: 2022-04-13

## 2022-04-13 ASSESSMENT — ENCOUNTER SYMPTOMS
ENDOCRINE NEGATIVE: 1
EYES NEGATIVE: 1
GASTROINTESTINAL NEGATIVE: 1
CONSTITUTIONAL NEGATIVE: 1
ALLERGIC/IMMUNOLOGIC NEGATIVE: 1
PSYCHIATRIC NEGATIVE: 1
HEMATOLOGIC/LYMPHATIC NEGATIVE: 1
RESPIRATORY NEGATIVE: 1
NEUROLOGICAL NEGATIVE: 1

## 2022-05-13 ENCOUNTER — TELEPHONE (OUTPATIENT)
Dept: FAMILY MEDICINE | Age: 87
End: 2022-05-13

## 2022-05-18 ENCOUNTER — ADVANCED DIRECTIVES (OUTPATIENT)
Dept: HEALTH INFORMATION MANAGEMENT | Facility: OTHER | Age: 87
End: 2022-05-18

## 2022-07-09 ENCOUNTER — LAB REQUISITION (OUTPATIENT)
Dept: LAB | Facility: HOSPITAL | Age: 87
End: 2022-07-09
Payer: MEDICARE

## 2022-07-09 DIAGNOSIS — G30.9 ALZHEIMER'S DISEASE, UNSPECIFIED (CODE) (HCC): ICD-10-CM

## 2022-07-09 DIAGNOSIS — M15.0 PRIMARY GENERALIZED (OSTEO)ARTHRITIS: ICD-10-CM

## 2022-07-09 DIAGNOSIS — F03.90 UNSPECIFIED DEMENTIA WITHOUT BEHAVIORAL DISTURBANCE (HCC): ICD-10-CM

## 2022-07-09 LAB
BILIRUB UR QL STRIP.AUTO: NEGATIVE
COLOR UR AUTO: YELLOW
GLUCOSE UR STRIP.AUTO-MCNC: NEGATIVE MG/DL
KETONES UR STRIP.AUTO-MCNC: NEGATIVE MG/DL
LEUKOCYTE ESTERASE UR QL STRIP.AUTO: NEGATIVE
NITRITE UR QL STRIP.AUTO: POSITIVE
PH UR STRIP.AUTO: 8.5 [PH] (ref 5–8)
PROT UR STRIP.AUTO-MCNC: NEGATIVE MG/DL
RBC UR QL AUTO: NEGATIVE
SP GR UR STRIP.AUTO: 1.01 (ref 1–1.03)
UROBILINOGEN UR STRIP.AUTO-MCNC: 1 MG/DL

## 2022-07-09 PROCEDURE — 81001 URINALYSIS AUTO W/SCOPE: CPT | Performed by: FAMILY MEDICINE

## 2022-07-09 PROCEDURE — 87077 CULTURE AEROBIC IDENTIFY: CPT | Performed by: FAMILY MEDICINE

## 2022-07-09 PROCEDURE — 87086 URINE CULTURE/COLONY COUNT: CPT | Performed by: FAMILY MEDICINE

## 2022-09-12 ENCOUNTER — LAB REQUISITION (OUTPATIENT)
Dept: LAB | Facility: HOSPITAL | Age: 87
End: 2022-09-12
Payer: MEDICARE

## 2022-09-12 DIAGNOSIS — M15.0 PRIMARY GENERALIZED (OSTEO)ARTHRITIS: ICD-10-CM

## 2022-09-12 DIAGNOSIS — F03.90 UNSPECIFIED DEMENTIA WITHOUT BEHAVIORAL DISTURBANCE (HCC): ICD-10-CM

## 2022-09-12 DIAGNOSIS — G30.9 ALZHEIMER'S DISEASE, UNSPECIFIED (CODE) (HCC): ICD-10-CM

## 2022-09-12 LAB
BILIRUB UR QL STRIP.AUTO: NEGATIVE
COLOR UR AUTO: YELLOW
GLUCOSE UR STRIP.AUTO-MCNC: NEGATIVE MG/DL
KETONES UR STRIP.AUTO-MCNC: NEGATIVE MG/DL
NITRITE UR QL STRIP.AUTO: POSITIVE
PH UR STRIP.AUTO: 6 [PH] (ref 5–8)
PROT UR STRIP.AUTO-MCNC: 30 MG/DL
SP GR UR STRIP.AUTO: 1.01 (ref 1–1.03)
UROBILINOGEN UR STRIP.AUTO-MCNC: 4 MG/DL
WBC #/AREA URNS AUTO: >50 /HPF

## 2022-09-12 PROCEDURE — 87186 SC STD MICRODIL/AGAR DIL: CPT | Performed by: NURSE PRACTITIONER

## 2022-09-12 PROCEDURE — 87086 URINE CULTURE/COLONY COUNT: CPT | Performed by: NURSE PRACTITIONER

## 2022-09-12 PROCEDURE — 81001 URINALYSIS AUTO W/SCOPE: CPT | Performed by: NURSE PRACTITIONER

## 2022-09-12 PROCEDURE — 87077 CULTURE AEROBIC IDENTIFY: CPT | Performed by: NURSE PRACTITIONER

## 2022-10-28 ENCOUNTER — HOSPITAL ENCOUNTER (EMERGENCY)
Facility: HOSPITAL | Age: 87
Discharge: HOME | End: 2022-10-28
Attending: EMERGENCY MEDICINE
Payer: MEDICARE

## 2022-10-28 ENCOUNTER — LAB REQUISITION (OUTPATIENT)
Dept: LAB | Facility: HOSPITAL | Age: 87
End: 2022-10-28
Payer: MEDICARE

## 2022-10-28 ENCOUNTER — HOSPITAL ENCOUNTER (EMERGENCY)
Facility: HOSPITAL | Age: 87
Discharge: NURSING, REHAB OR LONG-TERM CARE | End: 2022-10-28
Attending: EMERGENCY MEDICINE
Payer: MEDICARE

## 2022-10-28 ENCOUNTER — APPOINTMENT (OUTPATIENT)
Dept: GENERAL RADIOLOGY | Facility: HOSPITAL | Age: 87
End: 2022-10-28
Attending: EMERGENCY MEDICINE
Payer: MEDICARE

## 2022-10-28 ENCOUNTER — APPOINTMENT (OUTPATIENT)
Dept: CT IMAGING | Facility: HOSPITAL | Age: 87
End: 2022-10-28
Attending: EMERGENCY MEDICINE
Payer: MEDICARE

## 2022-10-28 VITALS
DIASTOLIC BLOOD PRESSURE: 80 MMHG | OXYGEN SATURATION: 100 % | HEART RATE: 70 BPM | RESPIRATION RATE: 20 BRPM | SYSTOLIC BLOOD PRESSURE: 172 MMHG | TEMPERATURE: 98 F

## 2022-10-28 DIAGNOSIS — G30.9 ALZHEIMER'S DISEASE, UNSPECIFIED (CODE) (HCC): ICD-10-CM

## 2022-10-28 DIAGNOSIS — F03.90 UNSPECIFIED DEMENTIA, UNSPECIFIED SEVERITY, WITHOUT BEHAVIORAL DISTURBANCE, PSYCHOTIC DISTURBANCE, MOOD DISTURBANCE, AND ANXIETY (HCC): ICD-10-CM

## 2022-10-28 DIAGNOSIS — K59.00 CONSTIPATION, UNSPECIFIED CONSTIPATION TYPE: ICD-10-CM

## 2022-10-28 DIAGNOSIS — M15.0 PRIMARY GENERALIZED (OSTEO)ARTHRITIS: ICD-10-CM
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
## 2022-10-28 LAB
ATRIAL RATE: 64 BPM
BILIRUB UR QL STRIP.AUTO: NEGATIVE
COLOR UR AUTO: YELLOW
GLUCOSE UR STRIP.AUTO-MCNC: NEGATIVE MG/DL
KETONES UR STRIP.AUTO-MCNC: NEGATIVE MG/DL
NITRITE UR QL STRIP.AUTO: POSITIVE
P-R INTERVAL: 296 MS
PH UR STRIP.AUTO: 6 [PH] (ref 5–8)
PROT UR STRIP.AUTO-MCNC: NEGATIVE MG/DL
Q-T INTERVAL: 402 MS
QRS DURATION: 70 MS
QTC CALCULATION (BEZET): 414 MS
R AXIS: -22 DEGREES
SP GR UR STRIP.AUTO: 1.01 (ref 1–1.03)
T AXIS: -10 DEGREES
UROBILINOGEN UR STRIP.AUTO-MCNC: <2 MG/DL
VENTRICULAR RATE: 64 BPM

## 2022-10-28 PROCEDURE — 99284 EMERGENCY DEPT VISIT MOD MDM: CPT

## 2022-10-28 PROCEDURE — 87077 CULTURE AEROBIC IDENTIFY: CPT | Performed by: FAMILY MEDICINE

## 2022-10-28 PROCEDURE — 87086 URINE CULTURE/COLONY COUNT: CPT | Performed by: FAMILY MEDICINE

## 2022-10-28 PROCEDURE — 73502 X-RAY EXAM HIP UNI 2-3 VIEWS: CPT | Performed by: EMERGENCY MEDICINE

## 2022-10-28 PROCEDURE — 81001 URINALYSIS AUTO W/SCOPE: CPT | Performed by: FAMILY MEDICINE

## 2022-10-28 PROCEDURE — 87186 SC STD MICRODIL/AGAR DIL: CPT | Performed by: FAMILY MEDICINE

## 2022-10-28 PROCEDURE — 93010 ELECTROCARDIOGRAM REPORT: CPT

## 2022-10-28 PROCEDURE — 93005 ELECTROCARDIOGRAM TRACING: CPT

## 2022-10-28 PROCEDURE — 70450 CT HEAD/BRAIN W/O DYE: CPT | Performed by: EMERGENCY MEDICINE

## 2022-10-28 RX ORDER — ACETAMINOPHEN 500 MG
1000 TABLET ORAL ONCE
Status: COMPLETED | OUTPATIENT
Start: 2022-10-28 | End: 2022-10-28

## 2022-10-28 RX ORDER — POLYETHYLENE GLYCOL 3350 17 G/17G
17 POWDER, FOR SOLUTION ORAL DAILY
Qty: 12 EACH | Refills: 0 | Status: SHIPPED | OUTPATIENT
Start: 2022-10-28 | End: 2022-11-27

## 2022-10-28 NOTE — ED INITIAL ASSESSMENT (HPI)
Patient from SNF, per EMS, had 1 week ago, increased lower back pain and bilateral hip pain, per patient worse on right hip. Patient is currently AOX1, language barrier. Unknown normal mental status.

## 2022-10-28 NOTE — ED PROVIDER NOTES
Patient Seen in: [facility] Emergency Department      History   Patient presents with:  Fall    Stated Complaint: Fall    Subjective:   HPI    81 yo with a hx of severe PVD and diabetic neuropathy of lower extremities and dementia presents to ED after a fall at [facility]  Patient with femur/ hip fracture last year    Objective:   No pertinent past medical history. No pertinent past surgical history. No pertinent social history. Review of Systems    Positive for stated complaint: Fall  Other systems are as noted in HPI. Constitutional and vital signs reviewed. All other systems reviewed and negative except as noted above. Physical Exam     ED Triage Vitals   BP    Pulse    Resp    Temp    Temp src    SpO2    O2 Device        Current:There were no vitals taken for this visit. Physical Exam    ***      ED Course   Labs Reviewed - No data to display       ***         MDM      ***                                   Disposition and Plan     Clinical Impression:  No diagnosis found. Disposition:  There is no disposition on file for this visit. There is no disposition time on file for this visit. Follow-up:  No follow-up provider specified.         Medications Prescribed:  Current Discharge Medication List hips.  EMS at bedside states that they were telling her that she was having low back pain and bilateral hip pain. HEENT exam reveals pupils are equal round reactive to light, head is atraumatic, no perceived tenderness to palpation over the cervical spine no step-offs noted patient is moving her head back-and-forth. Her lungs are clear to auscultation heart has regular rate and rhythm her abdomen is soft nontender nondistended. She seems to have tenderness to palpation over her bilateral hips, but is moving both of her lower extremities. Skin is intact no rashes noted. Patient is significantly demented    ED Course   Labs Reviewed - No data to display    EKG    Rate, intervals and axes as noted on EKG Report. Rate: 64  Rhythm: Sinus Rhythm  Readin bpm sinus rhythm no acute ST elevation or significant ST depression to suggest acute ischemia. This is a benign EKG. XR HIP W OR WO PELVIS 2 OR 3 VIEWS, RIGHT (CPT=73502)   Final Result    PROCEDURE:  XR HIP W OR WO PELVIS 2 OR 3 VIEWS, RIGHT ( PKL=41157)         LOCATION:  [facility]           TECHNIQUE:  Unilateral 2 to 3 views of the hip and pelvis if performed. COMPARISON:  None. INDICATIONS:  Fall. Hip pain. PATIENT STATED HISTORY: (As transcribed by Technologist)  Patient offered     no additional history at this time. FINDINGS:      No acute displaced osseous fracture is identified within the right hip. There is a chronic deformity to the proximal left femur status post     intramedullary isabel placement. Bones are osteopenic. Mild to moderate     degenerative changes within the right hip.                               =====    CONCLUSION:  See above.               Dictated by (CST): Claire Mayer MD on 10/28/2022 at 11:21 AM         Finalized by (CST): Claire Mayer MD on 10/28/2022 at 11:23 AM        CT BRAIN OR HEAD ([id number])   Final Result    PROCEDURE:  CT BRAIN OR HEAD ([id number]) LOCATION:  [id number]         COMPARISON:  None. INDICATIONS:  Fall         TECHNIQUE:  Noncontrast CT scanning is performed through the brain. Dose     reduction techniques were used. Dose information is transmitted to the [facility]) NRDR ([address])     which includes the Dose Index     Registry. PATIENT STATED HISTORY: (As transcribed by Technologist)  Pt unable to     state history. FINDINGS:      VENTRICLES/SULCI:  Moderate symmetric enlargement of ventricular system     and cortical sulci. No extra-axial fluid collection or midline shift. INTRACRANIAL:  No intracranial mass or hemorrhage. No sign of acute     territorial infarction. Moderate symmetric low attenuation of cerebral     white matter consistent with chronic small vessel ischemic changes. Atherosclerotic calcifications at the skull     base. SINUSES:           No sign of acute sinusitis. MASTOIDS:          No sign of acute inflammation. SKULL:             No evidence for fracture or osseous abnormality. OTHER:             Degenerative changes of both temporomandibular joints. .                               =====    CONCLUSION:      1. No acute intracranial pathology. 2. Moderate to severe global atrophy and chronic small vessel ischemic     changes of cerebral white matter. Dictated by (CST): Lamine Sharp MD on 10/28/2022 at 10:28 AM         Finalized by (CST): Lamine Sharp MD on 10/28/2022 at 10:30 AM                 MDM      Significantly demented 80year-old presents to the emergency department after a fall at the nursing home that was apparently unwitnessed but EMS is stating that she has been complaining of pain in both of her hips and her back which was told to them at the nursing home. Hip and pelvis x-rays obtained. No acute fracture of the hip noted. CT of the brain done given the fall.   Its unclear if the patient hit her head. That is also negative. Family has arrived. Patient was given acetaminophen for the pain. Differential diagnosis includes intracranial hemorrhage, hip fracture, pelvis fracture, lumbar spine fracture, acute CVA    Patient noted to be significantly hypertensive but improving in the emergency department after arrival.  Do suspect dementia and anxiety regarding change in surroundings as well as pain is contributing to the elevated blood pressure. Tylenol given for pain. No acute traumatic injuries found, constipation seen on x-rays. Discussed this with the family.   RicoaLAOLGA suggest                         Disposition and Plan     Clinical Impression:  Fall, initial encounter  (primary encounter diagnosis)  Constipation, unspecified constipation type     Disposition:  Discharge  10/28/2022 12:28 pm    Follow-up:  [facility] Emergency Department  [address] [id number]  [phone]              Medications Prescribed:  Discharge Medication List as of 10/28/2022 12:43 PM    START taking these medications    polyethylene glycol, PEG 3350, 17 g Oral Powd Pack  Take 17 g by mouth in the morning., Normal, Disp-12 each, R-0

## 2022-10-28 NOTE — ED QUICK NOTES
Son is at bedside, was updated on patient's status, patient was in [address] when son arrived. Son states that he believes the patient is constipated as he doesn't recall when the patient last had a BM.

## 2023-03-21 ENCOUNTER — LAB REQUISITION (OUTPATIENT)
Dept: LAB | Facility: HOSPITAL | Age: 88
End: 2023-03-21
Payer: MEDICARE

## 2023-03-21 DIAGNOSIS — G30.9 ALZHEIMER'S DISEASE, UNSPECIFIED (CODE) (HCC): ICD-10-CM

## 2023-03-21 DIAGNOSIS — F03.90 UNSPECIFIED DEMENTIA, UNSPECIFIED SEVERITY, WITHOUT BEHAVIORAL DISTURBANCE, PSYCHOTIC DISTURBANCE, MOOD DISTURBANCE, AND ANXIETY (HCC): ICD-10-CM

## 2023-03-21 DIAGNOSIS — M15.0 PRIMARY GENERALIZED (OSTEO)ARTHRITIS: ICD-10-CM

## 2023-03-21 LAB
ALBUMIN SERPL-MCNC: 3.4 G/DL (ref 3.4–5)
ALBUMIN/GLOB SERPL: 0.8 {RATIO} (ref 1–2)
ALP LIVER SERPL-CCNC: 90 U/L
ALT SERPL-CCNC: 20 U/L
ANION GAP SERPL CALC-SCNC: 9 MMOL/L (ref 0–18)
AST SERPL-CCNC: 19 U/L (ref 15–37)
BASOPHILS # BLD AUTO: 0.04 X10(3) UL (ref 0–0.2)
BASOPHILS NFR BLD AUTO: 0.7 %
BILIRUB SERPL-MCNC: 0.7 MG/DL (ref 0.1–2)
BUN BLD-MCNC: 14 MG/DL (ref 7–18)
CALCIUM BLD-MCNC: 9.1 MG/DL (ref 8.5–10.1)
CHLORIDE SERPL-SCNC: 106 MMOL/L (ref 98–112)
CO2 SERPL-SCNC: 25 MMOL/L (ref 21–32)
CREAT BLD-MCNC: 0.58 MG/DL
EOSINOPHIL # BLD AUTO: 0.21 X10(3) UL (ref 0–0.7)
EOSINOPHIL NFR BLD AUTO: 3.5 %
ERYTHROCYTE [DISTWIDTH] IN BLOOD BY AUTOMATED COUNT: 13.6 %
GFR SERPLBLD BASED ON 1.73 SQ M-ARVRAT: 85 ML/MIN/1.73M2 (ref 60–?)
GLOBULIN PLAS-MCNC: 4.2 G/DL (ref 2.8–4.4)
GLUCOSE BLD-MCNC: 127 MG/DL (ref 70–99)
HCT VFR BLD AUTO: 42.7 %
HGB BLD-MCNC: 13.9 G/DL
IMM GRANULOCYTES # BLD AUTO: 0.01 X10(3) UL (ref 0–1)
IMM GRANULOCYTES NFR BLD: 0.2 %
LYMPHOCYTES # BLD AUTO: 2.63 X10(3) UL (ref 1–4)
LYMPHOCYTES NFR BLD AUTO: 44.4 %
MCH RBC QN AUTO: 29.6 PG (ref 26–34)
MCHC RBC AUTO-ENTMCNC: 32.6 G/DL (ref 31–37)
MCV RBC AUTO: 91 FL
MONOCYTES # BLD AUTO: 0.33 X10(3) UL (ref 0.1–1)
MONOCYTES NFR BLD AUTO: 5.6 %
NEUTROPHILS # BLD AUTO: 2.71 X10 (3) UL (ref 1.5–7.7)
NEUTROPHILS # BLD AUTO: 2.71 X10(3) UL (ref 1.5–7.7)
NEUTROPHILS NFR BLD AUTO: 45.6 %
OSMOLALITY SERPL CALC.SUM OF ELEC: 292 MOSM/KG (ref 275–295)
PLATELET # BLD AUTO: 223 10(3)UL (ref 150–450)
POTASSIUM SERPL-SCNC: 3.2 MMOL/L (ref 3.5–5.1)
PROT SERPL-MCNC: 7.6 G/DL (ref 6.4–8.2)
RBC # BLD AUTO: 4.69 X10(6)UL
SODIUM SERPL-SCNC: 140 MMOL/L (ref 136–145)
TSI SER-ACNC: 1.34 MIU/ML (ref 0.36–3.74)
VIT B12 SERPL-MCNC: 326 PG/ML (ref 193–986)
WBC # BLD AUTO: 5.9 X10(3) UL (ref 4–11)

## 2023-03-21 PROCEDURE — 82607 VITAMIN B-12: CPT | Performed by: NURSE PRACTITIONER

## 2023-03-21 PROCEDURE — 85025 COMPLETE CBC W/AUTO DIFF WBC: CPT | Performed by: NURSE PRACTITIONER

## 2023-03-21 PROCEDURE — 84443 ASSAY THYROID STIM HORMONE: CPT | Performed by: NURSE PRACTITIONER

## 2023-03-21 PROCEDURE — 80053 COMPREHEN METABOLIC PANEL: CPT | Performed by: NURSE PRACTITIONER

## 2023-03-28 ENCOUNTER — LAB REQUISITION (OUTPATIENT)
Dept: LAB | Facility: HOSPITAL | Age: 88
End: 2023-03-28
Payer: MEDICARE

## 2023-03-28 DIAGNOSIS — M15.0 PRIMARY GENERALIZED (OSTEO)ARTHRITIS: ICD-10-CM

## 2023-03-28 DIAGNOSIS — G30.9 ALZHEIMER'S DISEASE, UNSPECIFIED (CODE) (HCC): ICD-10-CM

## 2023-03-28 DIAGNOSIS — F03.90 UNSPECIFIED DEMENTIA, UNSPECIFIED SEVERITY, WITHOUT BEHAVIORAL DISTURBANCE, PSYCHOTIC DISTURBANCE, MOOD DISTURBANCE, AND ANXIETY (HCC): ICD-10-CM

## 2023-03-28 LAB
ALBUMIN SERPL-MCNC: 3.3 G/DL (ref 3.4–5)
ALBUMIN/GLOB SERPL: 0.8 {RATIO} (ref 1–2)
ALP LIVER SERPL-CCNC: 98 U/L
ALT SERPL-CCNC: 21 U/L
ANION GAP SERPL CALC-SCNC: 5 MMOL/L (ref 0–18)
AST SERPL-CCNC: 23 U/L (ref 15–37)
BILIRUB SERPL-MCNC: 0.5 MG/DL (ref 0.1–2)
BUN BLD-MCNC: 12 MG/DL (ref 7–18)
CALCIUM BLD-MCNC: 9.4 MG/DL (ref 8.5–10.1)
CHLORIDE SERPL-SCNC: 110 MMOL/L (ref 98–112)
CO2 SERPL-SCNC: 26 MMOL/L (ref 21–32)
CREAT BLD-MCNC: 0.56 MG/DL
GFR SERPLBLD BASED ON 1.73 SQ M-ARVRAT: 86 ML/MIN/1.73M2 (ref 60–?)
GLOBULIN PLAS-MCNC: 4.4 G/DL (ref 2.8–4.4)
GLUCOSE BLD-MCNC: 89 MG/DL (ref 70–99)
OSMOLALITY SERPL CALC.SUM OF ELEC: 291 MOSM/KG (ref 275–295)
POTASSIUM SERPL-SCNC: 3.8 MMOL/L (ref 3.5–5.1)
PROT SERPL-MCNC: 7.7 G/DL (ref 6.4–8.2)
SODIUM SERPL-SCNC: 141 MMOL/L (ref 136–145)

## 2023-03-28 PROCEDURE — 80053 COMPREHEN METABOLIC PANEL: CPT | Performed by: NURSE PRACTITIONER

## 2023-04-17 ENCOUNTER — LAB REQUISITION (OUTPATIENT)
Dept: LAB | Facility: HOSPITAL | Age: 88
End: 2023-04-17
Payer: MEDICARE

## 2023-04-17 DIAGNOSIS — F03.90 UNSPECIFIED DEMENTIA, UNSPECIFIED SEVERITY, WITHOUT BEHAVIORAL DISTURBANCE, PSYCHOTIC DISTURBANCE, MOOD DISTURBANCE, AND ANXIETY (HCC): ICD-10-CM

## 2023-04-17 DIAGNOSIS — M15.0 PRIMARY GENERALIZED (OSTEO)ARTHRITIS: ICD-10-CM

## 2023-04-17 DIAGNOSIS — G30.9 ALZHEIMER'S DISEASE, UNSPECIFIED (CODE) (HCC): ICD-10-CM

## 2023-04-17 LAB
C DIFF TOX B STL QL: NEGATIVE
RBC #/AREA URNS AUTO: >10 /HPF
WBC #/AREA URNS AUTO: >50 /HPF

## 2023-04-17 PROCEDURE — 87086 URINE CULTURE/COLONY COUNT: CPT | Performed by: NURSE PRACTITIONER

## 2023-04-17 PROCEDURE — 87045 FECES CULTURE AEROBIC BACT: CPT | Performed by: NURSE PRACTITIONER

## 2023-04-17 PROCEDURE — 87046 STOOL CULTR AEROBIC BACT EA: CPT | Performed by: NURSE PRACTITIONER

## 2023-04-17 PROCEDURE — 87186 SC STD MICRODIL/AGAR DIL: CPT | Performed by: NURSE PRACTITIONER

## 2023-04-17 PROCEDURE — 87088 URINE BACTERIA CULTURE: CPT | Performed by: NURSE PRACTITIONER

## 2023-04-17 PROCEDURE — 87493 C DIFF AMPLIFIED PROBE: CPT | Performed by: NURSE PRACTITIONER

## 2023-04-17 PROCEDURE — 81015 MICROSCOPIC EXAM OF URINE: CPT | Performed by: NURSE PRACTITIONER

## 2023-04-18 ENCOUNTER — LAB REQUISITION (OUTPATIENT)
Dept: LAB | Facility: HOSPITAL | Age: 88
End: 2023-04-18
Payer: MEDICARE

## 2023-04-18 DIAGNOSIS — M15.0 PRIMARY GENERALIZED (OSTEO)ARTHRITIS: ICD-10-CM

## 2023-04-18 DIAGNOSIS — G30.9 ALZHEIMER'S DISEASE, UNSPECIFIED (CODE) (HCC): ICD-10-CM

## 2023-04-18 DIAGNOSIS — F03.90 UNSPECIFIED DEMENTIA, UNSPECIFIED SEVERITY, WITHOUT BEHAVIORAL DISTURBANCE, PSYCHOTIC DISTURBANCE, MOOD DISTURBANCE, AND ANXIETY (HCC): ICD-10-CM

## 2023-04-18 LAB
ALBUMIN SERPL-MCNC: 2.5 G/DL (ref 3.4–5)
ALBUMIN/GLOB SERPL: 0.7 {RATIO} (ref 1–2)
ALP LIVER SERPL-CCNC: 119 U/L
ALT SERPL-CCNC: 65 U/L
ANION GAP SERPL CALC-SCNC: 2 MMOL/L (ref 0–18)
AST SERPL-CCNC: 149 U/L (ref 15–37)
BASOPHILS # BLD AUTO: 0.02 X10(3) UL (ref 0–0.2)
BASOPHILS NFR BLD AUTO: 0.3 %
BILIRUB SERPL-MCNC: 1.2 MG/DL (ref 0.1–2)
BUN BLD-MCNC: 12 MG/DL (ref 7–18)
CALCIUM BLD-MCNC: 8.3 MG/DL (ref 8.5–10.1)
CHLORIDE SERPL-SCNC: 113 MMOL/L (ref 98–112)
CO2 SERPL-SCNC: 23 MMOL/L (ref 21–32)
CREAT BLD-MCNC: 0.48 MG/DL
EOSINOPHIL # BLD AUTO: 0.06 X10(3) UL (ref 0–0.7)
EOSINOPHIL NFR BLD AUTO: 1 %
ERYTHROCYTE [DISTWIDTH] IN BLOOD BY AUTOMATED COUNT: 14.1 %
GFR SERPLBLD BASED ON 1.73 SQ M-ARVRAT: 89 ML/MIN/1.73M2 (ref 60–?)
GLOBULIN PLAS-MCNC: 3.5 G/DL (ref 2.8–4.4)
GLUCOSE BLD-MCNC: 92 MG/DL (ref 70–99)
HCT VFR BLD AUTO: 36.9 %
HGB BLD-MCNC: 11.9 G/DL
IMM GRANULOCYTES # BLD AUTO: 0.01 X10(3) UL (ref 0–1)
IMM GRANULOCYTES NFR BLD: 0.2 %
LYMPHOCYTES # BLD AUTO: 1.55 X10(3) UL (ref 1–4)
LYMPHOCYTES NFR BLD AUTO: 25.4 %
MCH RBC QN AUTO: 29.3 PG (ref 26–34)
MCHC RBC AUTO-ENTMCNC: 32.2 G/DL (ref 31–37)
MCV RBC AUTO: 90.9 FL
MONOCYTES # BLD AUTO: 0.56 X10(3) UL (ref 0.1–1)
MONOCYTES NFR BLD AUTO: 9.2 %
NEUTROPHILS # BLD AUTO: 3.91 X10 (3) UL (ref 1.5–7.7)
NEUTROPHILS # BLD AUTO: 3.91 X10(3) UL (ref 1.5–7.7)
NEUTROPHILS NFR BLD AUTO: 63.9 %
OSMOLALITY SERPL CALC.SUM OF ELEC: 285 MOSM/KG (ref 275–295)
PLATELET # BLD AUTO: 235 10(3)UL (ref 150–450)
POTASSIUM SERPL-SCNC: 3.2 MMOL/L (ref 3.5–5.1)
PROT SERPL-MCNC: 6 G/DL (ref 6.4–8.2)
RBC # BLD AUTO: 4.06 X10(6)UL
SODIUM SERPL-SCNC: 138 MMOL/L (ref 136–145)
WBC # BLD AUTO: 6.1 X10(3) UL (ref 4–11)

## 2023-04-18 PROCEDURE — 85025 COMPLETE CBC W/AUTO DIFF WBC: CPT | Performed by: INTERNAL MEDICINE

## 2023-04-18 PROCEDURE — 80053 COMPREHEN METABOLIC PANEL: CPT | Performed by: INTERNAL MEDICINE

## 2023-04-21 ENCOUNTER — LAB REQUISITION (OUTPATIENT)
Dept: LAB | Facility: HOSPITAL | Age: 88
End: 2023-04-21
Payer: MEDICARE

## 2023-04-21 DIAGNOSIS — F03.90 UNSPECIFIED DEMENTIA, UNSPECIFIED SEVERITY, WITHOUT BEHAVIORAL DISTURBANCE, PSYCHOTIC DISTURBANCE, MOOD DISTURBANCE, AND ANXIETY (HCC): ICD-10-CM

## 2023-04-21 DIAGNOSIS — M15.0 PRIMARY GENERALIZED (OSTEO)ARTHRITIS: ICD-10-CM

## 2023-04-21 DIAGNOSIS — G30.9 ALZHEIMER'S DISEASE, UNSPECIFIED (CODE) (HCC): ICD-10-CM

## 2023-04-21 LAB
ANION GAP SERPL CALC-SCNC: 5 MMOL/L (ref 0–18)
BUN BLD-MCNC: 5 MG/DL (ref 7–18)
CALCIUM BLD-MCNC: 8.7 MG/DL (ref 8.5–10.1)
CHLORIDE SERPL-SCNC: 113 MMOL/L (ref 98–112)
CO2 SERPL-SCNC: 25 MMOL/L (ref 21–32)
CREAT BLD-MCNC: 0.39 MG/DL
GFR SERPLBLD BASED ON 1.73 SQ M-ARVRAT: 93 ML/MIN/1.73M2 (ref 60–?)
GLUCOSE BLD-MCNC: 88 MG/DL (ref 70–99)
OSMOLALITY SERPL CALC.SUM OF ELEC: 293 MOSM/KG (ref 275–295)
POTASSIUM SERPL-SCNC: 3.2 MMOL/L (ref 3.5–5.1)
SODIUM SERPL-SCNC: 143 MMOL/L (ref 136–145)

## 2023-04-21 PROCEDURE — 80048 BASIC METABOLIC PNL TOTAL CA: CPT | Performed by: INTERNAL MEDICINE

## 2023-04-28 ENCOUNTER — LAB REQUISITION (OUTPATIENT)
Dept: LAB | Facility: HOSPITAL | Age: 88
End: 2023-04-28
Payer: MEDICARE

## 2023-04-28 DIAGNOSIS — G30.9 ALZHEIMER'S DISEASE, UNSPECIFIED (CODE) (HCC): ICD-10-CM

## 2023-04-28 DIAGNOSIS — M15.0 PRIMARY GENERALIZED (OSTEO)ARTHRITIS: ICD-10-CM

## 2023-04-28 DIAGNOSIS — F03.90 UNSPECIFIED DEMENTIA, UNSPECIFIED SEVERITY, WITHOUT BEHAVIORAL DISTURBANCE, PSYCHOTIC DISTURBANCE, MOOD DISTURBANCE, AND ANXIETY (HCC): ICD-10-CM

## 2023-04-28 LAB
ANION GAP SERPL CALC-SCNC: 1 MMOL/L (ref 0–18)
BUN BLD-MCNC: 11 MG/DL (ref 7–18)
CALCIUM BLD-MCNC: 9.2 MG/DL (ref 8.5–10.1)
CHLORIDE SERPL-SCNC: 109 MMOL/L (ref 98–112)
CO2 SERPL-SCNC: 28 MMOL/L (ref 21–32)
CREAT BLD-MCNC: 0.54 MG/DL
FASTING STATUS PATIENT QL REPORTED: YES
GFR SERPLBLD BASED ON 1.73 SQ M-ARVRAT: 86 ML/MIN/1.73M2 (ref 60–?)
GLUCOSE BLD-MCNC: 90 MG/DL (ref 70–99)
OSMOLALITY SERPL CALC.SUM OF ELEC: 285 MOSM/KG (ref 275–295)
POTASSIUM SERPL-SCNC: 4.3 MMOL/L (ref 3.5–5.1)
SODIUM SERPL-SCNC: 138 MMOL/L (ref 136–145)

## 2023-04-28 PROCEDURE — 80048 BASIC METABOLIC PNL TOTAL CA: CPT | Performed by: INTERNAL MEDICINE

## 2023-05-05 ENCOUNTER — LAB REQUISITION (OUTPATIENT)
Dept: LAB | Facility: HOSPITAL | Age: 88
End: 2023-05-05
Payer: MEDICARE

## 2023-05-05 DIAGNOSIS — F03.90 UNSPECIFIED DEMENTIA, UNSPECIFIED SEVERITY, WITHOUT BEHAVIORAL DISTURBANCE, PSYCHOTIC DISTURBANCE, MOOD DISTURBANCE, AND ANXIETY (HCC): ICD-10-CM

## 2023-05-05 DIAGNOSIS — G30.9 ALZHEIMER'S DISEASE, UNSPECIFIED (CODE) (HCC): ICD-10-CM

## 2023-05-05 LAB
ANION GAP SERPL CALC-SCNC: 5 MMOL/L (ref 0–18)
BUN BLD-MCNC: 10 MG/DL (ref 7–18)
CALCIUM BLD-MCNC: 9.3 MG/DL (ref 8.5–10.1)
CHLORIDE SERPL-SCNC: 109 MMOL/L (ref 98–112)
CO2 SERPL-SCNC: 27 MMOL/L (ref 21–32)
CREAT BLD-MCNC: 0.54 MG/DL
GFR SERPLBLD BASED ON 1.73 SQ M-ARVRAT: 86 ML/MIN/1.73M2 (ref 60–?)
GLUCOSE BLD-MCNC: 119 MG/DL (ref 70–99)
OSMOLALITY SERPL CALC.SUM OF ELEC: 292 MOSM/KG (ref 275–295)
POTASSIUM SERPL-SCNC: 3.8 MMOL/L (ref 3.5–5.1)
SODIUM SERPL-SCNC: 141 MMOL/L (ref 136–145)

## 2023-05-05 PROCEDURE — 80048 BASIC METABOLIC PNL TOTAL CA: CPT | Performed by: INTERNAL MEDICINE

## 2023-08-25 ENCOUNTER — LAB REQUISITION (OUTPATIENT)
Dept: LAB | Facility: HOSPITAL | Age: 88
End: 2023-08-25
Attending: INTERNAL MEDICINE
Payer: MEDICARE

## 2023-08-25 DIAGNOSIS — F03.90 UNSPECIFIED DEMENTIA, UNSPECIFIED SEVERITY, WITHOUT BEHAVIORAL DISTURBANCE, PSYCHOTIC DISTURBANCE, MOOD DISTURBANCE, AND ANXIETY (HCC): ICD-10-CM

## 2023-08-25 DIAGNOSIS — G30.9 ALZHEIMER'S DISEASE, UNSPECIFIED (CODE) (HCC): ICD-10-CM

## 2023-08-25 DIAGNOSIS — M15.0 PRIMARY GENERALIZED (OSTEO)ARTHRITIS: ICD-10-CM

## 2023-08-25 LAB
BILIRUB UR QL STRIP.AUTO: NEGATIVE
GLUCOSE UR STRIP.AUTO-MCNC: NORMAL MG/DL
KETONES UR STRIP.AUTO-MCNC: NEGATIVE MG/DL
LEUKOCYTE ESTERASE UR QL STRIP.AUTO: 250
PH UR STRIP.AUTO: 5.5 [PH] (ref 5–8)
PROT UR STRIP.AUTO-MCNC: NEGATIVE MG/DL
RBC UR QL AUTO: NEGATIVE
SP GR UR STRIP.AUTO: 1 (ref 1–1.03)
UROBILINOGEN UR STRIP.AUTO-MCNC: NORMAL MG/DL

## 2023-08-25 PROCEDURE — 87086 URINE CULTURE/COLONY COUNT: CPT

## 2023-08-25 PROCEDURE — 87186 SC STD MICRODIL/AGAR DIL: CPT

## 2023-08-25 PROCEDURE — 81001 URINALYSIS AUTO W/SCOPE: CPT

## 2023-08-25 PROCEDURE — 87077 CULTURE AEROBIC IDENTIFY: CPT

## 2023-08-26 ENCOUNTER — LAB REQUISITION (OUTPATIENT)
Dept: LAB | Facility: HOSPITAL | Age: 88
End: 2023-08-26
Attending: INTERNAL MEDICINE
Payer: MEDICARE

## 2023-08-26 DIAGNOSIS — M15.0 PRIMARY GENERALIZED (OSTEO)ARTHRITIS: ICD-10-CM

## 2023-08-26 DIAGNOSIS — F03.90 UNSPECIFIED DEMENTIA, UNSPECIFIED SEVERITY, WITHOUT BEHAVIORAL DISTURBANCE, PSYCHOTIC DISTURBANCE, MOOD DISTURBANCE, AND ANXIETY (HCC): ICD-10-CM

## 2023-08-26 DIAGNOSIS — G30.9 ALZHEIMER'S DISEASE, UNSPECIFIED (CODE) (HCC): ICD-10-CM

## 2023-08-26 PROCEDURE — 87086 URINE CULTURE/COLONY COUNT: CPT

## 2023-08-26 PROCEDURE — 87077 CULTURE AEROBIC IDENTIFY: CPT

## 2023-08-26 PROCEDURE — 87186 SC STD MICRODIL/AGAR DIL: CPT

## 2023-08-26 PROCEDURE — 81015 MICROSCOPIC EXAM OF URINE: CPT

## 2023-12-26 ENCOUNTER — LAB REQUISITION (OUTPATIENT)
Dept: LAB | Facility: HOSPITAL | Age: 88
End: 2023-12-26
Payer: MEDICARE

## 2023-12-26 DIAGNOSIS — I10 ESSENTIAL (PRIMARY) HYPERTENSION: ICD-10-CM

## 2023-12-26 LAB
ALBUMIN SERPL-MCNC: 2.7 G/DL (ref 3.4–5)
ALBUMIN/GLOB SERPL: 0.6 {RATIO} (ref 1–2)
ALP LIVER SERPL-CCNC: 100 U/L
ALT SERPL-CCNC: 17 U/L
ANION GAP SERPL CALC-SCNC: 4 MMOL/L (ref 0–18)
AST SERPL-CCNC: 17 U/L (ref 15–37)
BASOPHILS # BLD AUTO: 0.03 X10(3) UL (ref 0–0.2)
BASOPHILS NFR BLD AUTO: 0.5 %
BILIRUB SERPL-MCNC: 0.9 MG/DL (ref 0.1–2)
BUN BLD-MCNC: 6 MG/DL (ref 9–23)
CALCIUM BLD-MCNC: 9 MG/DL (ref 8.5–10.1)
CHLORIDE SERPL-SCNC: 107 MMOL/L (ref 98–112)
CO2 SERPL-SCNC: 28 MMOL/L (ref 21–32)
CREAT BLD-MCNC: 0.58 MG/DL
EGFRCR SERPLBLD CKD-EPI 2021: 84 ML/MIN/1.73M2 (ref 60–?)
EOSINOPHIL # BLD AUTO: 0.16 X10(3) UL (ref 0–0.7)
EOSINOPHIL NFR BLD AUTO: 2.6 %
ERYTHROCYTE [DISTWIDTH] IN BLOOD BY AUTOMATED COUNT: 13.7 %
FASTING STATUS PATIENT QL REPORTED: YES
GLOBULIN PLAS-MCNC: 4.3 G/DL (ref 2.8–4.4)
GLUCOSE BLD-MCNC: 97 MG/DL (ref 70–99)
HCT VFR BLD AUTO: 37.9 %
HGB BLD-MCNC: 12.5 G/DL
IMM GRANULOCYTES # BLD AUTO: 0.01 X10(3) UL (ref 0–1)
IMM GRANULOCYTES NFR BLD: 0.2 %
LYMPHOCYTES # BLD AUTO: 1.85 X10(3) UL (ref 1–4)
LYMPHOCYTES NFR BLD AUTO: 29.9 %
MCH RBC QN AUTO: 29.7 PG (ref 26–34)
MCHC RBC AUTO-ENTMCNC: 33 G/DL (ref 31–37)
MCV RBC AUTO: 90 FL
MONOCYTES # BLD AUTO: 0.78 X10(3) UL (ref 0.1–1)
MONOCYTES NFR BLD AUTO: 12.6 %
NEUTROPHILS # BLD AUTO: 3.36 X10 (3) UL (ref 1.5–7.7)
NEUTROPHILS # BLD AUTO: 3.36 X10(3) UL (ref 1.5–7.7)
NEUTROPHILS NFR BLD AUTO: 54.2 %
OSMOLALITY SERPL CALC.SUM OF ELEC: 286 MOSM/KG (ref 275–295)
PLATELET # BLD AUTO: 247 10(3)UL (ref 150–450)
POTASSIUM SERPL-SCNC: 3.7 MMOL/L (ref 3.5–5.1)
PROT SERPL-MCNC: 7 G/DL (ref 6.4–8.2)
RBC # BLD AUTO: 4.21 X10(6)UL
SODIUM SERPL-SCNC: 139 MMOL/L (ref 136–145)
WBC # BLD AUTO: 6.2 X10(3) UL (ref 4–11)

## 2023-12-26 PROCEDURE — 85025 COMPLETE CBC W/AUTO DIFF WBC: CPT | Performed by: INTERNAL MEDICINE

## 2023-12-26 PROCEDURE — 80053 COMPREHEN METABOLIC PANEL: CPT | Performed by: INTERNAL MEDICINE

## 2024-01-09 PROBLEM — F03.918 DEMENTIA WITH BEHAVIORAL DISTURBANCE (HCC): Status: ACTIVE | Noted: 2021-04-09

## 2024-02-28 ENCOUNTER — APPOINTMENT (OUTPATIENT)
Dept: CT IMAGING | Facility: HOSPITAL | Age: 89
End: 2024-02-28
Attending: STUDENT IN AN ORGANIZED HEALTH CARE EDUCATION/TRAINING PROGRAM
Payer: MEDICARE

## 2024-02-28 PROCEDURE — 70496 CT ANGIOGRAPHY HEAD: CPT | Performed by: STUDENT IN AN ORGANIZED HEALTH CARE EDUCATION/TRAINING PROGRAM

## 2024-02-28 PROCEDURE — 70498 CT ANGIOGRAPHY NECK: CPT | Performed by: STUDENT IN AN ORGANIZED HEALTH CARE EDUCATION/TRAINING PROGRAM

## 2024-02-29 ENCOUNTER — TELEPHONE (OUTPATIENT)
Dept: NEUROLOGY | Facility: CLINIC | Age: 89
End: 2024-02-29

## 2024-02-29 NOTE — TELEPHONE ENCOUNTER
TIA CLINIC SCREENING    1. Date of ED visit/TIA diagnosis:  02/28/2024   Time of discharge from ED:  2023    2. Is patient currently admitted?  No   If YES - [facility] Appointment not required.      3. Does patient already see an JOSSE[facility] neurologist?  No  Name:     (if YES - [facility] Clinic Appointment not required.  Route message on to patient's neurologist)    4. Patient's current anti-platelet therapy:  81mg ASA    5. Patient's current statin therapy:  atorvastatin    6. Has 2D Echo with bubble test been done?  No  Date:      7. Is [facility] Clinic Appointment indicated?  Unsure

## 2024-02-29 NOTE — TELEPHONE ENCOUNTER
Clarita Zhao MD   to Me  Dena Robertson Nurse       2/29/24  2:52 PM  Regular appt. Dementia with AMS

## 2025-03-02 NOTE — PLAN OF CARE
Alert to self, confused and sundowning by the afternoon. Patient Citizen of [place] speaking only and when given instructions in [place] she is able to follow simple directions. Patient does need assistance with ADL and needs to be fed with meals.   POD#3, left hip 19 Contraindicated

## (undated) DEVICE — GUIW VERSANAIL ORTH TIB 100CM

## (undated) DEVICE — ABDOMINAL PAD: Brand: DERMACEA

## (undated) DEVICE — DRAPE,U/SHT,SPLIT,FILM,60X84,STERILE: Brand: MEDLINE

## (undated) DEVICE — REM POLYHESIVE ADULT PATIENT RETURN ELECTRODE: Brand: VALLEYLAB

## (undated) DEVICE — DRESSING AQUACEL AG 3.5X3.75

## (undated) DEVICE — STERILE HOOK LOCK LATEX FREE ELASTIC BANDAGE 4INX5YD: Brand: HOOK LOCK™

## (undated) DEVICE — STERILE SYNTHETIC POLYISOPRENE POWDER-FREE SURGICAL GLOVES WITH HYDROGEL COATING, SMOOTH FINISH, STRAIGHT FINGER: Brand: PROTEXIS

## (undated) DEVICE — C-ARMOR C-ARM EQUIPMENT COVERS CLEAR STERILE UNIVERSAL FIT 12 PER CASE: Brand: C-ARMOR

## (undated) DEVICE — HIP PINNING CDS: Brand: MEDLINE INDUSTRIES, INC.

## (undated) DEVICE — STERILE POLYISOPRENE POWDER-FREE SURGICAL GLOVES: Brand: PROTEXIS

## (undated) DEVICE — SUTURE VICRYL 0 CP-1

## (undated) DEVICE — SHEET,DRAPE,70X100,STERILE: Brand: MEDLINE

## (undated) DEVICE — SUTURE ETHILON 3-0 PS-1

## (undated) DEVICE — C-ARM: Brand: UNBRANDED

## (undated) DEVICE — KENDALL SCD EXPRESS SLEEVES, KNEE LENGTH, MEDIUM: Brand: KENDALL SCD

## (undated) DEVICE — DRESSING AQUACEL AG 3.5 X 6

## (undated) DEVICE — PADDING CAST COTTON STER 6

## (undated) DEVICE — 3M™ IOBAN™ 2 ANTIMICROBIAL INCISE DRAPE 6651EZ: Brand: IOBAN™ 2

## (undated) DEVICE — BANDAGE ELASTIC ACE 6\" X-LONG

## (undated) DEVICE — SUTURE VICRYL 2-0 FS-1

## (undated) DEVICE — PIN XTRNFX 508MM 3.2MM NTR NL

## (undated) DEVICE — PIN FX 30.5CM 3MM NTR NL

## (undated) DEVICE — SOL  .9 1000ML BTL

## (undated) DEVICE — DRILL SRG 152.5MM FR  NTR

## (undated) DEVICE — Device

## (undated) DEVICE — DRESSING AQUACEL AG 3.5 X 10

## (undated) DEVICE — OCCLUSIVE GAUZE STRIP OVERWRAP,3% BISMUTH TRIBROMOPHENATE IN PETROLATUM BLEND: Brand: XEROFORM

## (undated) NOTE — IP AVS SNAPSHOT
Patient Demographics     Address  [address] [facility] [address] Phone  [phone] [facility])      Emergency Contact(s)     Name Relation Home Work [address] [place] [address], [address] [place] [address] Son   [phone]      Allergies as of 4/13/2021  Review status set to Rev DRESSINGS:  • Change dressing daily using Medipore/coverlet once Aquacel (waterproof) dressing is removed (which is about 7 days after surgery). Patient should be standing or lying flat so dressing goes on smoothly.   (This dressing needed for hip patients Pill or shot form depending on what your physician orders. • IF placed on Coumadin, you may also need lab work done for monitoring. • You will bleed easier and bruise easier while on these medications.      • Usually you will be on a blood thinner for ab control you discomfort. • Contact physician if discomfort does not respond to pain medication. Body changes  • Constipation is common with the use of narcotics.   • Eat fiber rich foods and drink plenty of fluids, at least 4-6 glasses of water a day, the day and off at night. Surgeon will tell you when you don’t need them anymore. Notify your surgeon if you notice any of the following signs  • Separation of incision line. • Increased redness, swelling, or warmth of skin around incision.   • Inc Surgeon or PCP visit, request they fill out their part of the form. You fill our your portion, then go to [employer]. Some [facility] offices provide the same service.  ([facility] have this service; if you live in [place] tab 650 mg 04/13/21 0822 Given      [id number] lisinopril tab 20 mg 04/12/21 1333 Given      [id number] lisinopril tab 20 mg 04/13/21 0823 Given            LEFT LOWER ABDOMEN     Order ID Medication Name Action Time Action Reason Comments    [id number] Enoxap Jannet Amaya[PT. 2] is a[PT.3 80year old[PT.2] female with dementia[PT.1], d[PT. 3]iabetes mellitus, essential hypertension, dyslipidemia[PT. 4] w[PT.3]ho presented after witnessed fall at home. [PT.1] Patient unable to provide history.  Son at bedside p cannot be calculated (Unknown ideal weight. ). Recent Labs   Lab 04/09/21  1555   PTP 13.6   INR 1.01[PT. 2]       COVID-19 Lab Results    COVID-19[PT.1]  Lab Results   Component Value Date    COVID19 Not Detected 04/09/2021[PT. 2]       Pro-Calcitonin[PT. Type: Physician    Filed: 4/10/2021 11:55 AM Date of Service: 4/10/2021 11:54 AM Status: Signed    : Elisha Westbrook MD (Physician)     Consult Orders    1.  ED Consult to Orthopedic Surgery [[id number]] ordered by Harrel Hashimoto, MD at 04/09/21 [id number] [COMPLETED] ondansetron HCl (ZOFRAN) injection 4 mg, 4 mg, Intravenous, Once  lidocaine PF (XYLOCAINE) 1% injection, 2 mL, Injection, PRN  ropivacaine (NAROPIN) 0.5% injection, 30 mL, Injection, PRN  EPINEPHrine HCl (ADRENALIN) 1 MG/ML injection (Allergic ankle and move toes  Pain with any IR/ER of hip  Unable to test ROM due to pain  Sensation grossly intact to light touch throughout  Distal pulses intact  No calf swelling  Lesa's sign negative  Compartments soft  No signs or symptoms of DVT or compartmen nerves or blood vessels, malunion or nonunion, dislocation, continued pain, hip stiffness/loss of motion, hardware irritation, and the possibility of future arthrosis of the hip.   The risks of anesthesia including heart attack, stroke, and even death were Initiate a daily calcium supplement for people aged 72 years or older with a hip or vertebral fracture who are unable to achieve an intake of 1200 mg/d of calcium from food sources.       Admit to hospitalist, appreciate risk stratification and medical opti to have a fall at home. Pt is now s/p surgery,see below. X-ray hip 4/9/21:  FINDINGS:     There is a moderately displaced acute left-sided oblique and comminuted intertrochanteric fracture. There is shortening.   There is medial displacement of the dis currently need. ..   -   Moving to and from a bed to a chair (including a wheelchair)?: Total   -   Need to walk in hospital room?: Total   -   Climbing 3-5 steps with a railing?: Total       AM-PAC Score:  Raw Score: 10   Approx Degree of Impairment: 76.75 Confused and per d-I-l pt has been A & O x0 for many years.  Pt is participating with bed mobility and activity but painful and requiring a lot of assist.     Pt remains below PLOF and amb due to: pain, fear, dec in strength, dec in endurance, dec in balanc Evaluation Date: 4/11/2021  Type of Evaluation: Initial  Physician Order: PT Eval and Treat    Presenting Problem: closed displaced spiral fracture of shaft of L femur  Reason for Therapy: Mobility Dysfunction and Discharge Planning    History related to c alarm;Hard of hearing; Other (Comment) (dementia)  Fall Risk: High fall risk    WEIGHT BEARING RESTRICTION  Weight Bearing Restriction: L lower extremity           L Lower Extremity: Weight Bearing as Tolerated    PAIN ASSESSMENT  Rating: Unable to rate  Lo Therapy Provided: PT orders received, chart reviewed and RN approved PT session. PT with proper PPE donned to include mask, gloves, and goggles. Pt with mask donned 80% of the time. Pt lying in bed and very difficulty to arouse on the first attempt.  PT Based on this evaluation, patient's clinical presentation is evolving and overall the evaluation complexity is considered low.   These impairments and comorbidities manifest themselves as functional limitations in independent bed mobility, transfers, and ga Room Number: 419/127-V  Session: 1   Number of Visits to Meet Established Goals: 6    Presenting Problem: L femur fracture s/p IM fixation    History related to current admission: Admitted 4/9/21 from home after falling and fracturing her L femur.   Patient Dependent assistance (max x1, mod x 1)    Skilled Therapy Provided: Patient received in bed with daughter in law present and translating. Patient oriented to self only, re-orientation to situation and place provided, patient agreeable to work with therapy. setup, with stand by assist, while in chair and with cues  Patient will perform upper body dressing:  with min assist  Patient will perform toileting: with max assist     Functional Transfer Goals  Patient will transfer to bedside commode:  with mod assist level  Lives With: Spouse; Family (son and dtr and law and grandaughter)    Toilet and Equipment: Standard [employer]  Shower/Tub and Equipment: Walk-in shower; Shower chair;Hand-held showerhead       Occupation/Status:  (retired)     Drives: No       [facility] Inpatient Daily Activity Short Form  How much help from another person does the patient currently need…  -   Putting on and taking off regular lower body clothing?: Total  -   Bathing (including washing, rinsing, drying)?: Total  -   Toileting, which inclu ADLs[MM.2], instrumental activities of daily living, rest and sleep, work, leisure and social participation.      The patient is functioning below her previous functional level and would benefit from skilled inpatient OT to address the above deficits, maxim to bedside commode:  with mod assist    UE Exercise Program Goal  Patient will be supervision with bilateral AROM HEP (home exercise program). PPE worn by writer: droplet mask, gloves, goggles. Patient and her son wore droplet masks. [MM.1]       Attributi

## (undated) NOTE — LETTER
Alia [address]  [address] [facility] [address] [facility] [address] [facility]  Authorization for Surgical Operation and Procedure     Date:___________                                                                                                         Time:__________ following are some, but not all, of the potential risks that can occur: fever and allergic reactions, hemolytic reactions, transmission of diseases such as Hepatitis, AIDS and Cytomegalovirus (CMV) and fluid overload.   In the event that I wish to have an a The surgeon or my attending physician will determine when the applicable recovery period ends for purposes of reinstating the DNAR order.   10. Patients having a sterilization procedure: I understand that if the procedure is successful the results will be p to: a. Allow the anesthesiologist (anesthesia doctor) to give me medicine and do additional procedures as necessary.  Some examples are: Starting or using an “IV” to give me medicine, fluids or blood during my procedure, and having a breathing tube placed (“spinal”, “epidural”, & “nerve blocks”): I understand that rare but potential complications include headache, bleeding, infection, seizure, irregular heart rhythms, and nerve injury.     I can change my mind about having anesthesia services at any time be

## (undated) NOTE — IP AVS SNAPSHOT
[address]            (For Outpatient Use Only) Initial Admit Date: 4/9/2021   Inpt/Obs Admit Date: Inpt: 4/9/21 / Obs: N/A   Discharge Date:    Link Pattee:  [de-identified]   MRN: [de-identified]   CSN: [id number]   CEID: [id number] Subscriber:    Hospital Account Financial Class: Medicare Advantage    April 13, 2021